# Patient Record
Sex: MALE | Race: WHITE | Employment: OTHER | URBAN - NONMETROPOLITAN AREA
[De-identification: names, ages, dates, MRNs, and addresses within clinical notes are randomized per-mention and may not be internally consistent; named-entity substitution may affect disease eponyms.]

---

## 2022-03-28 ENCOUNTER — HOSPITAL ENCOUNTER (OUTPATIENT)
Age: 57
Setting detail: OBSERVATION
Discharge: HOME | End: 2022-03-31
Attending: EMERGENCY MEDICINE | Admitting: INTERNAL MEDICINE
Payer: COMMERCIAL

## 2022-03-28 ENCOUNTER — APPOINTMENT (OUTPATIENT)
Dept: GENERAL RADIOLOGY | Age: 57
End: 2022-03-28
Payer: COMMERCIAL

## 2022-03-28 DIAGNOSIS — L03.116 CELLULITIS OF LEFT LOWER EXTREMITY: Primary | ICD-10-CM

## 2022-03-28 PROBLEM — L03.90 CELLULITIS: Status: ACTIVE | Noted: 2022-03-28

## 2022-03-28 LAB
ANION GAP SERPL CALCULATED.3IONS-SCNC: 14 MEQ/L (ref 8–16)
BACTERIA: NORMAL
BASOPHILS # BLD: 0.4 %
BASOPHILS ABSOLUTE: 0 THOU/MM3 (ref 0–0.1)
BILIRUBIN URINE: NEGATIVE
BLOOD, URINE: NEGATIVE
BUN BLDV-MCNC: 20 MG/DL (ref 7–22)
C-REACTIVE PROTEIN: 15.62 MG/DL (ref 0–1)
CALCIUM SERPL-MCNC: 9.4 MG/DL (ref 8.5–10.5)
CASTS: NORMAL /LPF
CASTS: NORMAL /LPF
CHARACTER, URINE: CLEAR
CHLORIDE BLD-SCNC: 97 MEQ/L (ref 98–111)
CO2: 26 MEQ/L (ref 23–33)
COLOR: YELLOW
CREAT SERPL-MCNC: 1.2 MG/DL (ref 0.4–1.2)
CRYSTALS: NORMAL
EOSINOPHIL # BLD: 3 %
EOSINOPHILS ABSOLUTE: 0.2 THOU/MM3 (ref 0–0.4)
EPITHELIAL CELLS, UA: NORMAL /HPF
ERYTHROCYTE [DISTWIDTH] IN BLOOD BY AUTOMATED COUNT: 12.7 % (ref 11.5–14.5)
ERYTHROCYTE [DISTWIDTH] IN BLOOD BY AUTOMATED COUNT: 44.8 FL (ref 35–45)
GFR SERPL CREATININE-BSD FRML MDRD: 62 ML/MIN/1.73M2
GLUCOSE BLD-MCNC: 120 MG/DL (ref 70–108)
GLUCOSE, URINE: NEGATIVE MG/DL
HCT VFR BLD CALC: 37.9 % (ref 42–52)
HEMOGLOBIN: 12.3 GM/DL (ref 14–18)
IMMATURE GRANS (ABS): 0.04 THOU/MM3 (ref 0–0.07)
IMMATURE GRANULOCYTES: 0.5 %
KETONES, URINE: NEGATIVE
LACTIC ACID: 1.7 MMOL/L (ref 0.5–2)
LEUKOCYTE ESTERASE, URINE: NEGATIVE
LYMPHOCYTES # BLD: 16.4 %
LYMPHOCYTES ABSOLUTE: 1.3 THOU/MM3 (ref 1–4.8)
MCH RBC QN AUTO: 31.1 PG (ref 26–33)
MCHC RBC AUTO-ENTMCNC: 32.5 GM/DL (ref 32.2–35.5)
MCV RBC AUTO: 95.9 FL (ref 80–94)
MISCELLANEOUS LAB TEST RESULT: NORMAL
MONOCYTES # BLD: 10.8 %
MONOCYTES ABSOLUTE: 0.8 THOU/MM3 (ref 0.4–1.3)
MRSA SCREEN RT-PCR: NEGATIVE
NITRITE, URINE: NEGATIVE
NUCLEATED RED BLOOD CELLS: 0 /100 WBC
PH UA: 5 (ref 5–9)
PLATELET # BLD: 190 THOU/MM3 (ref 130–400)
PMV BLD AUTO: 10.2 FL (ref 9.4–12.4)
POTASSIUM SERPL-SCNC: 4.3 MEQ/L (ref 3.5–5.2)
PROTEIN UA: NEGATIVE MG/DL
RBC # BLD: 3.95 MILL/MM3 (ref 4.7–6.1)
RBC URINE: NORMAL /HPF
RENAL EPITHELIAL, UA: NORMAL
SEDIMENTATION RATE, ERYTHROCYTE: 80 MM/HR (ref 0–10)
SEG NEUTROPHILS: 68.9 %
SEGMENTED NEUTROPHILS ABSOLUTE COUNT: 5.4 THOU/MM3 (ref 1.8–7.7)
SODIUM BLD-SCNC: 137 MEQ/L (ref 135–145)
SPECIFIC GRAVITY UA: 1.01 (ref 1–1.03)
URIC ACID: 7.7 MG/DL (ref 3.7–7)
UROBILINOGEN, URINE: 0.2 EU/DL (ref 0–1)
WBC # BLD: 7.8 THOU/MM3 (ref 4.8–10.8)
WBC UA: NORMAL /HPF
YEAST: NORMAL

## 2022-03-28 PROCEDURE — 83605 ASSAY OF LACTIC ACID: CPT

## 2022-03-28 PROCEDURE — 2580000003 HC RX 258

## 2022-03-28 PROCEDURE — 96361 HYDRATE IV INFUSION ADD-ON: CPT

## 2022-03-28 PROCEDURE — G0378 HOSPITAL OBSERVATION PER HR: HCPCS

## 2022-03-28 PROCEDURE — 84550 ASSAY OF BLOOD/URIC ACID: CPT

## 2022-03-28 PROCEDURE — 96365 THER/PROPH/DIAG IV INF INIT: CPT

## 2022-03-28 PROCEDURE — 80048 BASIC METABOLIC PNL TOTAL CA: CPT

## 2022-03-28 PROCEDURE — 6370000000 HC RX 637 (ALT 250 FOR IP): Performed by: PHYSICIAN ASSISTANT

## 2022-03-28 PROCEDURE — 86140 C-REACTIVE PROTEIN: CPT

## 2022-03-28 PROCEDURE — 73630 X-RAY EXAM OF FOOT: CPT

## 2022-03-28 PROCEDURE — 6360000002 HC RX W HCPCS: Performed by: PHARMACIST

## 2022-03-28 PROCEDURE — 81001 URINALYSIS AUTO W/SCOPE: CPT

## 2022-03-28 PROCEDURE — 6370000000 HC RX 637 (ALT 250 FOR IP)

## 2022-03-28 PROCEDURE — 87641 MR-STAPH DNA AMP PROBE: CPT

## 2022-03-28 PROCEDURE — 99220 PR INITIAL OBSERVATION CARE/DAY 70 MINUTES: CPT | Performed by: PHYSICIAN ASSISTANT

## 2022-03-28 PROCEDURE — 96372 THER/PROPH/DIAG INJ SC/IM: CPT

## 2022-03-28 PROCEDURE — 85651 RBC SED RATE NONAUTOMATED: CPT

## 2022-03-28 PROCEDURE — 6360000002 HC RX W HCPCS: Performed by: PHYSICIAN ASSISTANT

## 2022-03-28 PROCEDURE — 96366 THER/PROPH/DIAG IV INF ADDON: CPT

## 2022-03-28 PROCEDURE — 73610 X-RAY EXAM OF ANKLE: CPT

## 2022-03-28 PROCEDURE — 96367 TX/PROPH/DG ADDL SEQ IV INF: CPT

## 2022-03-28 PROCEDURE — 2580000003 HC RX 258: Performed by: PHARMACIST

## 2022-03-28 PROCEDURE — 85025 COMPLETE CBC W/AUTO DIFF WBC: CPT

## 2022-03-28 PROCEDURE — 99283 EMERGENCY DEPT VISIT LOW MDM: CPT

## 2022-03-28 PROCEDURE — 2580000003 HC RX 258: Performed by: PHYSICIAN ASSISTANT

## 2022-03-28 PROCEDURE — 36415 COLL VENOUS BLD VENIPUNCTURE: CPT

## 2022-03-28 PROCEDURE — 6360000002 HC RX W HCPCS

## 2022-03-28 RX ORDER — ONDANSETRON 2 MG/ML
4 INJECTION INTRAMUSCULAR; INTRAVENOUS EVERY 6 HOURS PRN
Status: DISCONTINUED | OUTPATIENT
Start: 2022-03-28 | End: 2022-03-31 | Stop reason: HOSPADM

## 2022-03-28 RX ORDER — CIPROFLOXACIN 250 MG/1
250 TABLET, FILM COATED ORAL 2 TIMES DAILY
Status: ON HOLD | COMMUNITY
End: 2022-03-31 | Stop reason: HOSPADM

## 2022-03-28 RX ORDER — HYDROCODONE BITARTRATE AND ACETAMINOPHEN 5; 325 MG/1; MG/1
1 TABLET ORAL ONCE
Status: COMPLETED | OUTPATIENT
Start: 2022-03-28 | End: 2022-03-28

## 2022-03-28 RX ORDER — HYDROCODONE BITARTRATE AND ACETAMINOPHEN 7.5; 325 MG/1; MG/1
1 TABLET ORAL EVERY 6 HOURS PRN
Status: DISCONTINUED | OUTPATIENT
Start: 2022-03-28 | End: 2022-03-31 | Stop reason: HOSPADM

## 2022-03-28 RX ORDER — AMLODIPINE BESYLATE 5 MG/1
5 TABLET ORAL DAILY
COMMUNITY

## 2022-03-28 RX ORDER — SODIUM CHLORIDE 0.9 % (FLUSH) 0.9 %
10 SYRINGE (ML) INJECTION EVERY 12 HOURS SCHEDULED
Status: DISCONTINUED | OUTPATIENT
Start: 2022-03-28 | End: 2022-03-31 | Stop reason: HOSPADM

## 2022-03-28 RX ORDER — POLYETHYLENE GLYCOL 3350 17 G/17G
17 POWDER, FOR SOLUTION ORAL DAILY PRN
Status: DISCONTINUED | OUTPATIENT
Start: 2022-03-28 | End: 2022-03-31 | Stop reason: HOSPADM

## 2022-03-28 RX ORDER — MAGNESIUM SULFATE IN WATER 40 MG/ML
2000 INJECTION, SOLUTION INTRAVENOUS PRN
Status: DISCONTINUED | OUTPATIENT
Start: 2022-03-28 | End: 2022-03-31 | Stop reason: HOSPADM

## 2022-03-28 RX ORDER — SODIUM CHLORIDE 0.9 % (FLUSH) 0.9 %
10 SYRINGE (ML) INJECTION PRN
Status: DISCONTINUED | OUTPATIENT
Start: 2022-03-28 | End: 2022-03-31 | Stop reason: HOSPADM

## 2022-03-28 RX ORDER — ATORVASTATIN CALCIUM 20 MG/1
20 TABLET, FILM COATED ORAL DAILY
COMMUNITY

## 2022-03-28 RX ORDER — CEPHALEXIN 500 MG/1
500 CAPSULE ORAL 4 TIMES DAILY
Status: ON HOLD | COMMUNITY
End: 2022-03-31 | Stop reason: HOSPADM

## 2022-03-28 RX ORDER — SODIUM CHLORIDE 9 MG/ML
INJECTION, SOLUTION INTRAVENOUS PRN
Status: DISCONTINUED | OUTPATIENT
Start: 2022-03-28 | End: 2022-03-31 | Stop reason: HOSPADM

## 2022-03-28 RX ORDER — METOPROLOL TARTRATE 100 MG/1
100 TABLET ORAL DAILY
COMMUNITY

## 2022-03-28 RX ORDER — OMEPRAZOLE 20 MG/1
20 CAPSULE, DELAYED RELEASE ORAL DAILY
COMMUNITY

## 2022-03-28 RX ORDER — POTASSIUM CHLORIDE 20 MEQ/1
40 TABLET, EXTENDED RELEASE ORAL PRN
Status: DISCONTINUED | OUTPATIENT
Start: 2022-03-28 | End: 2022-03-31 | Stop reason: HOSPADM

## 2022-03-28 RX ORDER — LISINOPRIL AND HYDROCHLOROTHIAZIDE 25; 20 MG/1; MG/1
1 TABLET ORAL DAILY
COMMUNITY

## 2022-03-28 RX ORDER — ACETAMINOPHEN 325 MG/1
650 TABLET ORAL EVERY 6 HOURS PRN
Status: DISCONTINUED | OUTPATIENT
Start: 2022-03-28 | End: 2022-03-31 | Stop reason: HOSPADM

## 2022-03-28 RX ORDER — ACETAMINOPHEN 650 MG/1
650 SUPPOSITORY RECTAL EVERY 6 HOURS PRN
Status: DISCONTINUED | OUTPATIENT
Start: 2022-03-28 | End: 2022-03-31 | Stop reason: HOSPADM

## 2022-03-28 RX ORDER — ONDANSETRON 4 MG/1
4 TABLET, ORALLY DISINTEGRATING ORAL EVERY 8 HOURS PRN
Status: DISCONTINUED | OUTPATIENT
Start: 2022-03-28 | End: 2022-03-31 | Stop reason: HOSPADM

## 2022-03-28 RX ORDER — POTASSIUM CHLORIDE 7.45 MG/ML
10 INJECTION INTRAVENOUS PRN
Status: DISCONTINUED | OUTPATIENT
Start: 2022-03-28 | End: 2022-03-31 | Stop reason: HOSPADM

## 2022-03-28 RX ORDER — HYDROCODONE BITARTRATE AND ACETAMINOPHEN 5; 325 MG/1; MG/1
1 TABLET ORAL EVERY 6 HOURS PRN
COMMUNITY

## 2022-03-28 RX ORDER — SODIUM CHLORIDE 9 MG/ML
INJECTION, SOLUTION INTRAVENOUS CONTINUOUS
Status: DISCONTINUED | OUTPATIENT
Start: 2022-03-28 | End: 2022-03-31 | Stop reason: HOSPADM

## 2022-03-28 RX ADMIN — HYDROCODONE BITARTRATE AND ACETAMINOPHEN 1 TABLET: 5; 325 TABLET ORAL at 15:57

## 2022-03-28 RX ADMIN — HYDROCODONE BITARTRATE AND ACETAMINOPHEN 1 TABLET: 7.5; 325 TABLET ORAL at 21:06

## 2022-03-28 RX ADMIN — PIPERACILLIN AND TAZOBACTAM 3375 MG: 3; .375 INJECTION, POWDER, LYOPHILIZED, FOR SOLUTION INTRAVENOUS at 17:17

## 2022-03-28 RX ADMIN — SODIUM CHLORIDE: 9 INJECTION, SOLUTION INTRAVENOUS at 20:22

## 2022-03-28 RX ADMIN — SODIUM CHLORIDE: 9 INJECTION, SOLUTION INTRAVENOUS at 17:11

## 2022-03-28 RX ADMIN — VANCOMYCIN HYDROCHLORIDE 1750 MG: 5 INJECTION, POWDER, LYOPHILIZED, FOR SOLUTION INTRAVENOUS at 21:08

## 2022-03-28 RX ADMIN — ENOXAPARIN SODIUM 40 MG: 40 INJECTION SUBCUTANEOUS at 21:06

## 2022-03-28 ASSESSMENT — ENCOUNTER SYMPTOMS
NAUSEA: 0
SHORTNESS OF BREATH: 0
VOMITING: 0

## 2022-03-28 ASSESSMENT — PAIN DESCRIPTION - PROGRESSION: CLINICAL_PROGRESSION: GRADUALLY WORSENING

## 2022-03-28 ASSESSMENT — PAIN DESCRIPTION - ONSET: ONSET: ON-GOING

## 2022-03-28 ASSESSMENT — PAIN DESCRIPTION - FREQUENCY: FREQUENCY: CONTINUOUS

## 2022-03-28 ASSESSMENT — PAIN DESCRIPTION - ORIENTATION
ORIENTATION: LEFT
ORIENTATION: LEFT

## 2022-03-28 ASSESSMENT — PAIN SCALES - GENERAL
PAINLEVEL_OUTOF10: 5
PAINLEVEL_OUTOF10: 8
PAINLEVEL_OUTOF10: 8
PAINLEVEL_OUTOF10: 7
PAINLEVEL_OUTOF10: 7

## 2022-03-28 ASSESSMENT — PAIN - FUNCTIONAL ASSESSMENT: PAIN_FUNCTIONAL_ASSESSMENT: 0-10

## 2022-03-28 ASSESSMENT — PAIN DESCRIPTION - PAIN TYPE
TYPE: ACUTE PAIN
TYPE: ACUTE PAIN

## 2022-03-28 ASSESSMENT — PAIN DESCRIPTION - LOCATION
LOCATION: FOOT
LOCATION: FOOT

## 2022-03-28 NOTE — PROGRESS NOTES
Pharmacy Consult      Prema Gandhi is a 59 y.o. male for whom pharmacy has been consulted to dose Zosyn. Patient Active Problem List   Diagnosis    Cellulitis     Allergies:  Naproxen and Sulfa antibiotics     Recent Labs     03/28/22  1323   CREATININE 1.2     Ht/Wt:   Ht Readings from Last 1 Encounters:   03/28/22 5' 8\" (1.727 m)        Wt Readings from Last 1 Encounters:   03/28/22 211 lb (95.7 kg)       Estimated Creatinine Clearance: 74 mL/min (based on SCr of 1.2 mg/dL). Assessment/Plan:  Will start Zosyn 3.375g IV q8h extended infusion for CrCl above 20 ml/min    Thank you for the consult.       Dillan Salguero, PharmD, BCPS 3/28/2022 4:03 PM

## 2022-03-28 NOTE — ED TRIAGE NOTES
Pt to the ED with c/o left foot swelling and redness. Pt states he was seen at [facility] and was given an antibiotic and pain medication 3 day ago.  Pt states it is not better so he came for further eval.

## 2022-03-28 NOTE — H&P
Hospitalist History & Physical    Patient:  Gena Brenner    Unit/Bed:6E-57/057-A  YOB: 1965  MRN: [id number]   Acct: [de-identified]   PCP: Temo Ng MD  Code Status: Full Code    Date of Service: Pt seen/examined on 03/28/22 and admitted to Inpatient with expected LOS greater than two midnights due to medical therapy. Chief Complaint: left foot pain    Assessment/Plan:    1. LLE cellulitis: Pt was started on Kelfex and Cipro as outpatient 3 days ago. Erythema worsening. See media. ESR, CPR significantly elevated. Outline borders, apply ACE wrap. He was started on Zosyn and Vanc in the ED, cont for now. If MRSA PCR is negative, consider deescalating abx to IV cefazolin. Concern for cellulitis vs gout flare. Uric acid is 7.7. Left foot XR reported calcaneal enthesophytes. However, difficult to assess if there is joint involvement with his significant swelling and erythema and pt denies hx gout. Clinically more consistent with cellulitis, will continue abx and monitor for improvement. Consider podiatry consult. 2. Elevated uric acid: As stated above. Will hydrate patient. Check UA. Repeat in AM.       3. LELA vs CKD stage 2: Pt appears dry, reports poor po intake. Will give IVF and repeat in AM.     4. Normocytic anemia: mild, no signs of acute bleeding. 5. Hyperglycemia: Mild. Check A1c. History of Present Illness: This is a 59year old male with PMHx HTN, HLD who presented to [address] [place] [address] for evaluation of left foot pain and swelling. The patient reports redness and swelling starting 5 days ago. He states he was seen at OSH on 3/25 was diagnosed with cellulitis, sent home on oral Keflex and Cipro. Erythema was outlined at that time and patient was told to go to the ER if it spread beyond its borders. Patient states he noticed this morning erythema had worsened. He states his swelling and pain continue to worsen.  He denies injury, however there is a small scab on the bottom of his foot where it looks like he may have had a small puncture wound. Patient reports subjective fever/chills. The patient states that he has not been eating and drinking well due to generally not feeling well. Patient denies shortness of breath, chest pain, abdominal pain, N/V/D, urinary symptoms. Patient mated to hospitalist service for further management      Review of Systems: Pertinent positives as noted in the HPI. All other systems reviewed and negative. Past Medical History:        Diagnosis Date    Arthritis     History of blood transfusion     Hyperlipidemia     Hypertension        Past Surgical History:        Procedure Laterality Date    FACIAL FRACTURE SURGERY  1983       Home Medications:   No current facility-administered medications on file prior to encounter. Current Outpatient Medications on File Prior to Encounter   Medication Sig Dispense Refill    cephALEXin (KEFLEX) 500 MG capsule Take 500 mg by mouth 4 times daily      atorvastatin (LIPITOR) 20 MG tablet Take 20 mg by mouth daily      amLODIPine (NORVASC) 5 MG tablet Take 5 mg by mouth daily      metoprolol (LOPRESSOR) 100 MG tablet Take 100 mg by mouth daily      omeprazole (PRILOSEC) 20 MG delayed release capsule Take 20 mg by mouth daily      lisinopril-hydroCHLOROthiazide (PRINZIDE;ZESTORETIC) 20-25 MG per tablet Take 1 tablet by mouth daily      ciprofloxacin (CIPRO) 250 MG tablet Take 250 mg by mouth 2 times daily      Multiple Vitamin (MULTI-DAY PO) Take 1 tablet by mouth      HYDROcodone-acetaminophen (NORCO) 5-325 MG per tablet Take 1 tablet by mouth every 6 hours as needed for Pain. Allergies:    Naproxen and Sulfa antibiotics    Social History:    reports that he has been smoking. He has been smoking about 1.00 pack per day. He uses smokeless tobacco. He reports current alcohol use of about 42.0 standard drinks of alcohol per week. Family History:   History reviewed.  No pertinent family history. Diet:  ADULT DIET; Regular      Physical Exam:  /74   Pulse 74   Temp 97.8 °F (36.6 °C) (Oral)   Resp 18   Ht 5' 8\" (1.727 m)   Wt 211 lb (95.7 kg)   SpO2 99%   BMI 32.08 kg/m²   General:   Pleasant male. NAD. HEENT:  normocephalic and atraumatic. No scleral icterus. PERR. Neck: supple. No JVD. No thyromegaly. Lungs: clear to auscultation. No retractions  Cardiac: RRR without murmur. Abdomen: soft. Nontender. Bowel sounds positive. Extremities:  No clubbing, cyanosis, or edema x 4. Vasculature: capillary refill < 3 seconds. Palpable LE pulses bilaterally. Skin:  warm and dry. No rashes or lesions. Psych:  Alert and oriented x3. Affect appropriate  Lymph:  No supraclavicular adenopathy. Neurologic:  No focal deficit. No seizures. Data: (All radiographs, tracings, PFTs, and imaging are personally viewed and interpreted unless otherwise noted)  Labs:   Recent Labs     03/28/22  1323   WBC 7.8   HGB 12.3*   HCT 37.9*        Recent Labs     03/28/22  1323      K 4.3   CL 97*   CO2 26   BUN 20   CREATININE 1.2   CALCIUM 9.4     No results for input(s): AST, ALT, BILIDIR, BILITOT, ALKPHOS in the last 72 hours. No results for input(s): INR in the last 72 hours. No results for input(s): Connee Matar in the last 72 hours. Urinalysis:   Lab Results   Component Value Date    NITRU NEGATIVE 03/28/2022    WBCUA 0-2 03/28/2022    BACTERIA NONE SEEN 03/28/2022    RBCUA NONE SEEN 03/28/2022    BLOODU NEGATIVE 03/28/2022    SPECGRAV 1.012 03/28/2022       EKG: No prior ECG    Radiology:  XR FOOT LEFT (MIN 3 VIEWS)   Final Result       1. No evidence of acute osseous abnormality of the left foot and ankle. 2. Calcaneal enthesophytes. **This report has been created using voice recognition software. It may contain minor errors which are inherent in voice recognition technology. **      Final report electronically signed by Dr. Shania Ayala MD on 3/28/2022 1:36 PM      XR ANKLE LEFT (MIN 3 VIEWS)   Final Result       1. No evidence of acute osseous abnormality of the left foot and ankle. 2. Calcaneal enthesophytes. **This report has been created using voice recognition software. It may contain minor errors which are inherent in voice recognition technology. **      Final report electronically signed by Dr. Jeremiah Troy MD on 3/28/2022 1:36 PM        XR ANKLE LEFT (MIN 3 VIEWS)    Result Date: 3/28/2022  PROCEDURE: XR ANKLE LEFT (MIN 3 VIEWS), XR FOOT LEFT (MIN 3 VIEWS) CLINICAL INFORMATION: pain. Pain and swelling in left foot and ankle for 6 days. No known injury. COMPARISON: No prior study. TECHNIQUE: 4 views of the left foot and 3 views of the left ankle. FINDINGS:  There is a suggestion of metatarsus primus varus and hallux valgus on these nonweightbearing views. There is otherwise anatomic vertebral body height and alignment. No fracture is evident. Joint spaces appear preserved. The ankle mortise appears intact. There is soft tissue swelling about the foot and ankle. There are calcaneal enthesophytes at the origin of the plantar fascia and insertion of Achilles tendon. 1. No evidence of acute osseous abnormality of the left foot and ankle. 2. Calcaneal enthesophytes. **This report has been created using voice recognition software. It may contain minor errors which are inherent in voice recognition technology. ** Final report electronically signed by Dr. Jeremiah Troy MD on 3/28/2022 1:36 PM    XR FOOT LEFT (MIN 3 VIEWS)    Result Date: 3/28/2022  PROCEDURE: XR ANKLE LEFT (MIN 3 VIEWS), XR FOOT LEFT (MIN 3 VIEWS) CLINICAL INFORMATION: pain. Pain and swelling in left foot and ankle for 6 days. No known injury. COMPARISON: No prior study. TECHNIQUE: 4 views of the left foot and 3 views of the left ankle. FINDINGS:  There is a suggestion of metatarsus primus varus and hallux valgus on these nonweightbearing views.  There is otherwise anatomic vertebral body height and alignment. No fracture is evident. Joint spaces appear preserved. The ankle mortise appears intact. There is soft tissue swelling about the foot and ankle. There are calcaneal enthesophytes at the origin of the plantar fascia and insertion of Achilles tendon. 1. No evidence of acute osseous abnormality of the left foot and ankle. 2. Calcaneal enthesophytes. **This report has been created using voice recognition software. It may contain minor errors which are inherent in voice recognition technology. ** Final report electronically signed by Dr. Meaghan Chao MD on 3/28/2022 1:36 PM        Tele:   [] yes             [x] no      Thank you rAiel Montalvo MD for the opportunity to be involved in this patient's care.     Electronically signed by Yvonne Coronel PA-C on 3/28/2022 at 7:18 PM

## 2022-03-28 NOTE — PROGRESS NOTES
Patient arrived to [phone] [id number] from ED with c/o left foot pain. Left foot is swollen red and tender. Strong pulse. Denies numbness. Oriented to room. Call light within reach. Sister at bedside.

## 2022-03-28 NOTE — FLOWSHEET NOTE
22 1830   Treatment Team Notification   Reason for Communication Review case   Team Member Name Dr. Coleta Apgar Provider   Method of Communication Secure Message   Response Waiting for response   Notification Time 1830   LLE cellulitis, worsening despite OP oral therapy

## 2022-03-28 NOTE — PROGRESS NOTES
[address] [facility] Pharmacokinetic Monitoring Service - Vancomycin     Kelsie Gonzalez is a 59 y.o. male starting on vancomycin therapy for LLE cellulitis. Pharmacy consulted by Mikey Elise DPM for monitoring and adjustment. Target Concentration: Goal trough of 10-15 mg/L and AUC/RIZWAN <500 mg*hr/L    Additional Antimicrobials: Zosyn    Pertinent Laboratory Values: Wt Readings from Last 1 Encounters:   03/28/22 211 lb (95.7 kg)     Temp Readings from Last 1 Encounters:   03/28/22 97.8 °F (36.6 °C) (Oral)     Estimated Creatinine Clearance: 74 mL/min (based on SCr of 1.2 mg/dL). Recent Labs     03/28/22  1323   CREATININE 1.2   WBC 7.8     Procalcitonin: n/a    Pertinent Cultures: None    MRSA Nasal Swab: N/A. Non-respiratory infection. Plan:  1. Dosing recommendations based on Bayesian software  2. Start vancomycin 1750mg IV q24h  a. Anticipated AUC of 461 and trough concentration of 12.8 at steady state  3. Renal labs as indicated   4. Vancomycin concentration not scheduled yet  5.  Pharmacy will continue to monitor patient and adjust therapy as indicated    Thank you for the consult,  Marleny [facility]  3/28/2022 4:05 PM

## 2022-03-28 NOTE — ED PROVIDER NOTES
[place] ENCOUNTER          Pt Name: Mehreen Mccormack  MRN: [id number]  [place] 1965  Date of evaluation: 3/28/2022  Treating Resident Physician: Jorge Patrick DPM  Supervising Physician: Bertram Nieves MD    CHIEF COMPLAINT       Chief Complaint   Patient presents with    Foot Swelling     History obtained from the patient. HISTORY OF PRESENT ILLNESS    HPI  Merheen Mccormack is a 59 y.o. non-diabetic male who presents to the emergency department for evaluation of left foot pain and swelling. The patient relates that his pain is 10 out of 10. The patient relates that the pain, swelling and redness started last Tuesday. He presented to the ED at [facility] on Friday. He relates that they took x-rays and there were no fractures. He states that they also tested him for gout which was negative. There is a marker line drawn around the proximal aspect/just distal to ankle, which the redness is exceeding. The patient is unaware of when the redness started to exceed to the line, but believes it just started last night. The patient denies any injury or trauma to left foot. His sister relates that there was a small painful area to the bottom of his left foot that she tried to scrape off. He relates that he had intense pain when she did so. The patient relates that his foot was initially very hot and is now warm. He was not given Keflex and ciprofloxacin, which he has taken for 3 days. The patient is unable to put foot in a shoe. A bed sheet causes pain to left foot. He relates that he has subjectively had a fever and chills. He denies any nausea or vomiting. The patient has no other acute complaints at this time. REVIEW OF SYSTEMS   Review of Systems   Constitutional: Positive for chills and fever. Respiratory: Negative for shortness of breath. Cardiovascular: Negative for chest pain.    Gastrointestinal: Negative for nausea and vomiting. PAST MEDICAL AND SURGICAL HISTORY   History reviewed. No pertinent past medical history. History reviewed. No pertinent surgical history. MEDICATIONS     Current Facility-Administered Medications:     HYDROcodone-acetaminophen (NORCO) 5-325 MG per tablet 1 tablet, 1 tablet, Oral, Once, Myrl José, DPM  No current outpatient medications on file. SOCIAL HISTORY     Social History     Social History Narrative    Not on file     Social History     Tobacco Use    Smoking status: Not on file    Smokeless tobacco: Not on file   Substance Use Topics    Alcohol use: Not on file    Drug use: Not on file         ALLERGIES   No Known Allergies      FAMILY HISTORY   History reviewed. No pertinent family history. PREVIOUS RECORDS   Previous records reviewed: Has previous ED encounters. .        PHYSICAL EXAM     ED Triage Vitals [03/28/22 1145]   BP Temp Temp Source Pulse Resp SpO2 Height Weight   122/84 98.4 °F (36.9 °C) Oral 83 (!) 100 100 % -- --     Initial vital signs and nursing assessment reviewed and normal. There is no height or weight on file to calculate BMI. Pulsoximetry is normal per my interpretation. Additional Vital Signs:  Vitals:    03/28/22 1341   BP: 120/74   Pulse: 79   Resp: 18   Temp:    SpO2: 98%       Physical Exam  Cardiovascular:      Pulses:           Dorsalis pedis pulses are detected w/ Doppler on the left side. Posterior tibial pulses are detected w/ Doppler on the left side. Musculoskeletal:      Left ankle: Swelling present. Tenderness present. Decreased range of motion. Normal pulse. Left foot: Decreased range of motion. Normal capillary refill. Swelling and tenderness present. Normal pulse. Feet:    Feet:      Left foot:      Protective Sensation: 10 sites tested. 10 sites sensed. Skin integrity: Erythema and warmth present. Neurological:      Mental Status: He is alert.              MEDICAL DECISION MAKING Initial Assessment:   1. Cellulitis, left foot. Plan:    Patient seen and evaluated.  Labs: CBC, BMP, CRP, ESR, uric acid, lactic acid, GFR; taken and reviewed, significant CRP value of 15.62   Respiratory and pulse vitals reevaluated; stable   X-rays of left foot and left ankle taken and reviewed; no evidence of acute osseous abnormality   Dispensed 1 tablet of [place] in ED   Pharmacy to dose: Vancomycin and Zosyn   All of patient's questions were addressed   Patient is being admitted. ED RESULTS   Laboratory results:  Labs Reviewed   CBC WITH AUTO DIFFERENTIAL - Abnormal; Notable for the following components:       Result Value    RBC 3.95 (*)     Hemoglobin 12.3 (*)     Hematocrit 37.9 (*)     MCV 95.9 (*)     All other components within normal limits   BASIC METABOLIC PANEL - Abnormal; Notable for the following components:    Chloride 97 (*)     Glucose 120 (*)     All other components within normal limits   C-REACTIVE PROTEIN - Abnormal; Notable for the following components:    CRP 15.62 (*)     All other components within normal limits   URIC ACID - Abnormal; Notable for the following components:    Uric Acid 7.7 (*)     All other components within normal limits   GLOMERULAR FILTRATION RATE, ESTIMATED - Abnormal; Notable for the following components:    Est, Glom Filt Rate 62 (*)     All other components within normal limits   LACTIC ACID   ANION GAP   SEDIMENTATION RATE       Radiologic studies results:  XR FOOT LEFT (MIN 3 VIEWS)   Final Result       1. No evidence of acute osseous abnormality of the left foot and ankle. 2. Calcaneal enthesophytes. **This report has been created using voice recognition software. It may contain minor errors which are inherent in voice recognition technology. **      Final report electronically signed by Dr. Nancy Elkins MD on 3/28/2022 1:36 PM      XR ANKLE LEFT (MIN 3 VIEWS)   Final Result       1.  No evidence of acute osseous abnormality of the left foot and ankle. 2. Calcaneal enthesophytes. **This report has been created using voice recognition software. It may contain minor errors which are inherent in voice recognition technology. **      Final report electronically signed by Dr. Dora Andrea MD on 3/28/2022 1:36 PM          ED Medications administered this visit:   Medications   HYDROcodone-acetaminophen (NORCO) 5-325 MG per tablet 1 tablet (has no administration in time range)         ED COURSE          Strict return precautions and follow up instructions were discussed with the patient prior to discharge, with which the patient agrees. MEDICATION CHANGES     New Prescriptions    No medications on file         FINAL DISPOSITION     Final diagnoses:   Cellulitis of left lower extremity     Condition: condition: fair  Dispo: Admit to med/surg floor      This transcription was electronically signed. Parts of this transcriptions may have been dictated by use of voice recognition software and electronically transcribed, and parts may have been transcribed with the assistance of an ED scribe. The transcription may contain errors not detected in proofreading. Please refer to my supervising physician's documentation if my documentation differs. Electronically Signed: Scott Smith DPM, 03/28/22, 2:37 PM         Scott Smith DPM  Resident  03/28/22 9714    Attending Supervising [de-identified] Attestation Statement  I performed a history and physical examination on the patient and discussed the management with the resident physician. I reviewed and agree with the findings and plan as documented in her note unless described otherwise below. Pt presents to the Er with worsening redness, swelling and pain to L foot, primarily on dorsum of foot. Redness getting worse (advanced past previous demarcated line) despite being on oral abx x 3 days.   Given significant cellulitis, worsening symptoms despite oral abx, will admit patient for iv abx for cellulitis with failed outpatient treatment.     Electronically signed by Lisa Ron MD on 3/28/22 at 7:28 PM EDT       Bertram Nieves MD  03/28/22 9225

## 2022-03-29 ENCOUNTER — APPOINTMENT (OUTPATIENT)
Dept: ULTRASOUND IMAGING | Age: 57
End: 2022-03-29
Payer: COMMERCIAL

## 2022-03-29 LAB
ANION GAP SERPL CALCULATED.3IONS-SCNC: 13 MEQ/L (ref 8–16)
AVERAGE GLUCOSE: 96 MG/DL (ref 70–126)
BASOPHILS # BLD: 0.4 %
BASOPHILS ABSOLUTE: 0 THOU/MM3 (ref 0–0.1)
BUN BLDV-MCNC: 18 MG/DL (ref 7–22)
CALCIUM SERPL-MCNC: 8.8 MG/DL (ref 8.5–10.5)
CHLORIDE BLD-SCNC: 99 MEQ/L (ref 98–111)
CO2: 26 MEQ/L (ref 23–33)
CREAT SERPL-MCNC: 1.3 MG/DL (ref 0.4–1.2)
EOSINOPHIL # BLD: 4.5 %
EOSINOPHILS ABSOLUTE: 0.3 THOU/MM3 (ref 0–0.4)
ERYTHROCYTE [DISTWIDTH] IN BLOOD BY AUTOMATED COUNT: 12.6 % (ref 11.5–14.5)
ERYTHROCYTE [DISTWIDTH] IN BLOOD BY AUTOMATED COUNT: 43.5 FL (ref 35–45)
GFR SERPL CREATININE-BSD FRML MDRD: 56 ML/MIN/1.73M2
GLUCOSE BLD-MCNC: 112 MG/DL (ref 70–108)
HBA1C MFR BLD: 5.2 % (ref 4.4–6.4)
HCT VFR BLD CALC: 34.3 % (ref 42–52)
HEMOGLOBIN: 11.4 GM/DL (ref 14–18)
IMMATURE GRANS (ABS): 0.02 THOU/MM3 (ref 0–0.07)
IMMATURE GRANULOCYTES: 0.4 %
LYMPHOCYTES # BLD: 19.6 %
LYMPHOCYTES ABSOLUTE: 1.1 THOU/MM3 (ref 1–4.8)
MCH RBC QN AUTO: 31.4 PG (ref 26–33)
MCHC RBC AUTO-ENTMCNC: 33.2 GM/DL (ref 32.2–35.5)
MCV RBC AUTO: 94.5 FL (ref 80–94)
MONOCYTES # BLD: 10.6 %
MONOCYTES ABSOLUTE: 0.6 THOU/MM3 (ref 0.4–1.3)
NUCLEATED RED BLOOD CELLS: 0 /100 WBC
PLATELET # BLD: 188 THOU/MM3 (ref 130–400)
PMV BLD AUTO: 9.8 FL (ref 9.4–12.4)
POTASSIUM REFLEX MAGNESIUM: 4.2 MEQ/L (ref 3.5–5.2)
RBC # BLD: 3.63 MILL/MM3 (ref 4.7–6.1)
SEG NEUTROPHILS: 64.5 %
SEGMENTED NEUTROPHILS ABSOLUTE COUNT: 3.6 THOU/MM3 (ref 1.8–7.7)
SODIUM BLD-SCNC: 138 MEQ/L (ref 135–145)
URIC ACID: 6.8 MG/DL (ref 3.7–7)
WBC # BLD: 5.6 THOU/MM3 (ref 4.8–10.8)

## 2022-03-29 PROCEDURE — 84550 ASSAY OF BLOOD/URIC ACID: CPT

## 2022-03-29 PROCEDURE — 96372 THER/PROPH/DIAG INJ SC/IM: CPT

## 2022-03-29 PROCEDURE — 99225 PR SBSQ OBSERVATION CARE/DAY 25 MINUTES: CPT | Performed by: INTERNAL MEDICINE

## 2022-03-29 PROCEDURE — 36415 COLL VENOUS BLD VENIPUNCTURE: CPT

## 2022-03-29 PROCEDURE — G0378 HOSPITAL OBSERVATION PER HR: HCPCS

## 2022-03-29 PROCEDURE — 96366 THER/PROPH/DIAG IV INF ADDON: CPT

## 2022-03-29 PROCEDURE — 6360000002 HC RX W HCPCS

## 2022-03-29 PROCEDURE — 96361 HYDRATE IV INFUSION ADD-ON: CPT

## 2022-03-29 PROCEDURE — 6370000000 HC RX 637 (ALT 250 FOR IP): Performed by: PHYSICIAN ASSISTANT

## 2022-03-29 PROCEDURE — 2580000003 HC RX 258: Performed by: PHARMACIST

## 2022-03-29 PROCEDURE — 85025 COMPLETE CBC W/AUTO DIFF WBC: CPT

## 2022-03-29 PROCEDURE — 83036 HEMOGLOBIN GLYCOSYLATED A1C: CPT

## 2022-03-29 PROCEDURE — 2580000003 HC RX 258: Performed by: PHYSICIAN ASSISTANT

## 2022-03-29 PROCEDURE — 2580000003 HC RX 258

## 2022-03-29 PROCEDURE — 80048 BASIC METABOLIC PNL TOTAL CA: CPT

## 2022-03-29 PROCEDURE — 76770 US EXAM ABDO BACK WALL COMP: CPT

## 2022-03-29 PROCEDURE — 6360000002 HC RX W HCPCS: Performed by: PHYSICIAN ASSISTANT

## 2022-03-29 PROCEDURE — 6360000002 HC RX W HCPCS: Performed by: PHARMACIST

## 2022-03-29 RX ORDER — CYCLOBENZAPRINE HCL 10 MG
5 TABLET ORAL ONCE
Status: COMPLETED | OUTPATIENT
Start: 2022-03-29 | End: 2022-03-30

## 2022-03-29 RX ADMIN — PIPERACILLIN AND TAZOBACTAM 3375 MG: 3; .375 INJECTION, POWDER, LYOPHILIZED, FOR SOLUTION INTRAVENOUS at 17:03

## 2022-03-29 RX ADMIN — PIPERACILLIN AND TAZOBACTAM 3375 MG: 3; .375 INJECTION, POWDER, LYOPHILIZED, FOR SOLUTION INTRAVENOUS at 00:39

## 2022-03-29 RX ADMIN — PIPERACILLIN AND TAZOBACTAM 3375 MG: 3; .375 INJECTION, POWDER, LYOPHILIZED, FOR SOLUTION INTRAVENOUS at 08:29

## 2022-03-29 RX ADMIN — VANCOMYCIN HYDROCHLORIDE 1750 MG: 5 INJECTION, POWDER, LYOPHILIZED, FOR SOLUTION INTRAVENOUS at 21:27

## 2022-03-29 RX ADMIN — ENOXAPARIN SODIUM 40 MG: 40 INJECTION SUBCUTANEOUS at 21:26

## 2022-03-29 RX ADMIN — HYDROCODONE BITARTRATE AND ACETAMINOPHEN 1 TABLET: 7.5; 325 TABLET ORAL at 03:21

## 2022-03-29 RX ADMIN — HYDROCODONE BITARTRATE AND ACETAMINOPHEN 1 TABLET: 7.5; 325 TABLET ORAL at 17:50

## 2022-03-29 RX ADMIN — SODIUM CHLORIDE, PRESERVATIVE FREE 10 ML: 5 INJECTION INTRAVENOUS at 08:27

## 2022-03-29 RX ADMIN — HYDROCODONE BITARTRATE AND ACETAMINOPHEN 1 TABLET: 7.5; 325 TABLET ORAL at 10:22

## 2022-03-29 ASSESSMENT — PAIN DESCRIPTION - PROGRESSION
CLINICAL_PROGRESSION: GRADUALLY IMPROVING
CLINICAL_PROGRESSION: NOT CHANGED
CLINICAL_PROGRESSION: GRADUALLY IMPROVING
CLINICAL_PROGRESSION: NOT CHANGED
CLINICAL_PROGRESSION: GRADUALLY IMPROVING
CLINICAL_PROGRESSION: NOT CHANGED
CLINICAL_PROGRESSION: GRADUALLY IMPROVING
CLINICAL_PROGRESSION: GRADUALLY IMPROVING

## 2022-03-29 ASSESSMENT — PAIN - FUNCTIONAL ASSESSMENT
PAIN_FUNCTIONAL_ASSESSMENT: PREVENTS OR INTERFERES SOME ACTIVE ACTIVITIES AND ADLS
PAIN_FUNCTIONAL_ASSESSMENT: ACTIVITIES ARE NOT PREVENTED

## 2022-03-29 ASSESSMENT — PAIN DESCRIPTION - ORIENTATION
ORIENTATION: LEFT

## 2022-03-29 ASSESSMENT — PAIN DESCRIPTION - LOCATION
LOCATION: HIP;FOOT
LOCATION: HIP;FOOT
LOCATION: FOOT

## 2022-03-29 ASSESSMENT — PAIN SCALES - GENERAL
PAINLEVEL_OUTOF10: 7
PAINLEVEL_OUTOF10: 6
PAINLEVEL_OUTOF10: 5
PAINLEVEL_OUTOF10: 7
PAINLEVEL_OUTOF10: 5
PAINLEVEL_OUTOF10: 6
PAINLEVEL_OUTOF10: 5

## 2022-03-29 ASSESSMENT — PAIN DESCRIPTION - PAIN TYPE
TYPE: ACUTE PAIN

## 2022-03-29 ASSESSMENT — PAIN DESCRIPTION - FREQUENCY
FREQUENCY: CONTINUOUS

## 2022-03-29 ASSESSMENT — PAIN DESCRIPTION - ONSET
ONSET: ON-GOING

## 2022-03-29 ASSESSMENT — PAIN DESCRIPTION - DESCRIPTORS
DESCRIPTORS: ACHING

## 2022-03-29 NOTE — PLAN OF CARE
Problem: Falls - Risk of:  Goal: Will remain free from falls  Description: Will remain free from falls  3/29/2022 0940 by Miguel Hidalgo RN  Note: Pt remains free from falls this shift, call light within reach, bed in lowest position, hourly rounding continued. Problem: Mobility - Impaired:  Goal: Mobility will improve to maximum level  Description: Mobility will improve to maximum level  3/29/2022 0940 by Miguel Hidalgo RN  Note: Pt unable to ambulate at this time, pt/ot ordered. Problem: Pain:  Goal: Control of acute pain  Description: Control of acute pain  3/29/2022 0940 by Miguel Hidalgo RN  Note: Pt complains of 4-6/10 pain this shift prn norco every 6 hours. Problem: Skin Integrity - Impaired:  Goal: Will show no infection signs and symptoms  Description: Will show no infection signs and symptoms  3/29/2022 0940 by Miguel Hidalgo RN  Note: Pt remains free from new skin breakdown this shift, cellulitis marked and no spreading noticed, pt afebrile this shift. Problem: Discharge Planning:  Goal: Discharged to appropriate level of care  Description: Discharged to appropriate level of care  3/29/2022 0940 by Miguel Hidalgo RN  Note: Pt plans to discharge home alone when medically appropriate, no needs voiced at this time.

## 2022-03-29 NOTE — PROGRESS NOTES
Hospitalist Progress Note      Patient:  Jakub Lora    Unit/Bed:6E-57/057-A  YOB: 1965  MRN: [id number]   Acct: [de-identified]     PCP: Anu Bloom MD  Date of Admission: 3/28/2022        Chief Complaint: left foot pain    Assessment/Plan:    1. Left foot cellulitis no sepsis : Pt was started on Kelfex and Cipro as outpatient 3 days ago. Erythema worsening. See media. ESR, CPR significantly elevated. Outline borders, apply ACE wrap. He was started on Zosyn and Vanc in the ED, cont for now. If MRSA PCR is negative, consider deescalating abx to IV cefazolin. Concern for cellulitis vs gout flare. Uric acid is 7.7. Left foot XR reported calcaneal enthesophytes. However, difficult to assess if there is joint involvement with his significant swelling and erythema and pt denies hx gout. Clinically more consistent with cellulitis, will continue abx and monitor for improvement. Consider podiatry consult. 3/29-foot looks much improved from yesterday-continue IV antibiotics for now will defer podiatry consult for now follow-up culture reports and will de-escalate antibiotics accordingly    2. Elevated uric acid: As stated above. Will hydrate patient. Check UA. Repeat in AM.       3. LELA vs CKD stage 2: Pt appears dry, reports poor po intake. Will give IVF and repeat in AM.     4. Normocytic anemia: mild, no signs of acute bleeding. 5. Hyperglycemia: Mild. Check A1c. History of Present Illness: This is a 59year old male with PMHx HTN, HLD who presented to [facility] for evaluation of left foot pain and swelling. The patient reports redness and swelling starting 5 days ago. He states he was seen at OSH on 3/25 was diagnosed with cellulitis, sent home on oral Keflex and Cipro. Erythema was outlined at that time and patient was told to go to the ER if it spread beyond its borders. Patient states he noticed this morning erythema had worsened.   He states his swelling and pain continue to worsen. He denies injury, however there is a small scab on the bottom of his foot where it looks like he may have had a small puncture wound. Patient reports subjective fever/chills. The patient states that he has not been eating and drinking well due to generally not feeling well. Patient denies shortness of breath, chest pain, abdominal pain, N/V/D, urinary symptoms. Patient mated to hospitalist service for further management      Subjective:- (Last 24 hours)    Foot pain better  Able to walk on it right now  No fever no chills no nausea no vomiting    Past medical history, family history, social history and allergies reviewed again and is unchanged since admission. ROS (All review of systems completed. Pertinent positives noted. Otherwise All other systems reviewed and negative.)     Scheduled Meds:   sodium chloride flush  10 mL IntraVENous 2 times per day    enoxaparin  40 mg SubCUTAneous Q24H    piperacillin-tazobactam  3,375 mg IntraVENous Q8H    vancomycin (VANCOCIN) intermittent dosing (placeholder)   Other RX Placeholder    vancomycin  1,750 mg IntraVENous Q24H     Continuous Infusions:   sodium chloride 50 mL/hr at 03/28/22 1711    sodium chloride 100 mL/hr at 03/28/22 2022     PRN Meds:.sodium chloride flush, sodium chloride, ondansetron **OR** ondansetron, polyethylene glycol, acetaminophen **OR** acetaminophen, potassium chloride **OR** potassium alternative oral replacement **OR** potassium chloride, magnesium sulfate, HYDROcodone-acetaminophen     Diet:  ADULT DIET; Regular      Physical Exam:  /79   Pulse 82   Temp 97.8 °F (36.6 °C) (Oral)   Resp 16   Ht 5' 8\" (1.727 m)   Wt 211 lb (95.7 kg)   SpO2 98%   BMI 32.08 kg/m²   General:   Pleasant male. NAD. HEENT:  normocephalic and atraumatic. No scleral icterus. PERR. Neck: supple. No JVD. No thyromegaly. Lungs: clear to auscultation. No retractions  Cardiac: RRR without murmur. Abdomen: soft. Nontender.   Bowel sounds positive. Extremities:  No clubbing, cyanosis, or edema x 4. Vasculature: capillary refill < 3 seconds. Palpable LE pulses bilaterally. Skin:  warm and dry. No rashes or lesions. Psych:  Alert and oriented x3. Affect appropriate  Lymph:  No supraclavicular adenopathy. Neurologic:  No focal deficit. No seizures. Data: (All radiographs, tracings, PFTs, and imaging are personally viewed and interpreted unless otherwise noted)  Labs:   Recent Labs     03/28/22  1323 03/29/22  0636   WBC 7.8 5.6   HGB 12.3* 11.4*   HCT 37.9* 34.3*    188     Recent Labs     03/28/22  1323 03/29/22  0636    138   K 4.3 4.2   CL 97* 99   CO2 26 26   BUN 20 18   CREATININE 1.2 1.3*   CALCIUM 9.4 8.8     No results for input(s): AST, ALT, BILIDIR, BILITOT, ALKPHOS in the last 72 hours. No results for input(s): INR in the last 72 hours. No results for input(s): Chayito Jack in the last 72 hours. Urinalysis:   Lab Results   Component Value Date    NITRU NEGATIVE 03/28/2022    WBCUA 0-2 03/28/2022    BACTERIA NONE SEEN 03/28/2022    RBCUA NONE SEEN 03/28/2022    BLOODU NEGATIVE 03/28/2022    SPECGRAV 1.012 03/28/2022       EKG: No prior ECG    Radiology:  XR FOOT LEFT (MIN 3 VIEWS)   Final Result       1. No evidence of acute osseous abnormality of the left foot and ankle. 2. Calcaneal enthesophytes. **This report has been created using voice recognition software. It may contain minor errors which are inherent in voice recognition technology. **      Final report electronically signed by Dr. Gracie Barrios MD on 3/28/2022 1:36 PM      XR ANKLE LEFT (MIN 3 VIEWS)   Final Result       1. No evidence of acute osseous abnormality of the left foot and ankle. 2. Calcaneal enthesophytes. **This report has been created using voice recognition software. It may contain minor errors which are inherent in voice recognition technology. **      Final report electronically signed by Dr. Gracie Barrios MD on 3/28/2022 1:36 PM      US RENAL COMPLETE    (Results Pending)     XR ANKLE LEFT (MIN 3 VIEWS)    Result Date: 3/28/2022  PROCEDURE: XR ANKLE LEFT (MIN 3 VIEWS), XR FOOT LEFT (MIN 3 VIEWS) CLINICAL INFORMATION: pain. Pain and swelling in left foot and ankle for 6 days. No known injury. COMPARISON: No prior study. TECHNIQUE: 4 views of the left foot and 3 views of the left ankle. FINDINGS:  There is a suggestion of metatarsus primus varus and hallux valgus on these nonweightbearing views. There is otherwise anatomic vertebral body height and alignment. No fracture is evident. Joint spaces appear preserved. The ankle mortise appears intact. There is soft tissue swelling about the foot and ankle. There are calcaneal enthesophytes at the origin of the plantar fascia and insertion of Achilles tendon. 1. No evidence of acute osseous abnormality of the left foot and ankle. 2. Calcaneal enthesophytes. **This report has been created using voice recognition software. It may contain minor errors which are inherent in voice recognition technology. ** Final report electronically signed by Dr. Gracie Barrios MD on 3/28/2022 1:36 PM    XR FOOT LEFT (MIN 3 VIEWS)    Result Date: 3/28/2022  PROCEDURE: XR ANKLE LEFT (MIN 3 VIEWS), XR FOOT LEFT (MIN 3 VIEWS) CLINICAL INFORMATION: pain. Pain and swelling in left foot and ankle for 6 days. No known injury. COMPARISON: No prior study. TECHNIQUE: 4 views of the left foot and 3 views of the left ankle. FINDINGS:  There is a suggestion of metatarsus primus varus and hallux valgus on these nonweightbearing views. There is otherwise anatomic vertebral body height and alignment. No fracture is evident. Joint spaces appear preserved. The ankle mortise appears intact. There is soft tissue swelling about the foot and ankle. There are calcaneal enthesophytes at the origin of the plantar fascia and insertion of Achilles tendon.       1. No evidence of acute osseous abnormality of the left foot and ankle. 2. Calcaneal enthesophytes. **This report has been created using voice recognition software. It may contain minor errors which are inherent in voice recognition technology. ** Final report electronically signed by Dr. Junior Bourgeois MD on 3/28/2022 1:36 PM        Tele:   [] yes             [x] no      Thank you Lianna Sebastian MD for the opportunity to be involved in this patient's care.     Electronically signed by Severa Byers, MD on 3/29/2022 at 3:41 PM

## 2022-03-29 NOTE — CARE COORDINATION
3/29/22, 7:31 AM EDT  DISCHARGE PLANNING EVALUATION:    Marco Serrano       Admitted: 3/28/2022/ [address] [facility],[address] day: 0   Location: [address] [place] [address] Reason for admit: Cellulitis [L03.90]  Cellulitis of left lower extremity [C72.239]   PMH:  has a past medical history of Arthritis, History of blood transfusion, Hyperlipidemia, and Hypertension. Procedure:   XR left ankle   Impression:           1. No evidence of acute osseous abnormality of the left foot and ankle. 2. Calcaneal enthesophytes. XR Left foot    Impression:           1. No evidence of acute osseous abnormality of the left foot and ankle. 2. Calcaneal enthesophytes. Barriers to Discharge:  From ED. ID consult, IV fluids, Lovenox, IV Zosyn, IV Vancomycin, electrolyte replacement protocols, pain control. PCP: Mariposa Hauser MD   %    Patient Goals/Plan/Treatment Preferences: Met with Canelo Osullivan. He currently lives a home alone. He has family support. He is independent. Plan is to return home at discharge. He denies need for DME and declines HH. Will follow for potential needs. Transportation/Food Security/Housekeeping Addressed:  No issues identified.

## 2022-03-29 NOTE — PLAN OF CARE
Problem: Falls - Risk of:  Goal: Will remain free from falls  Description: Will remain free from falls  Outcome: Ongoing  Note: Call light, overbed table, and belongings within reach. Bed/ chair alarm activated. Patient included in hourly rounds. Goal: Absence of physical injury  Description: Absence of physical injury  Outcome: Ongoing     Problem: Mobility - Impaired:  Goal: Mobility will improve to maximum level  Description: Mobility will improve to maximum level  Outcome: Ongoing  Note: Patient is currently unable to ambulate,  pt/ot ordered     Problem: Pain:  Goal: Control of acute pain  Description: Control of acute pain  Outcome: Ongoing  Note: Patient is rating pain at 6/10 during this shift. Patient is taking norco to control pain. Goal: Control of chronic pain  Description: Control of chronic pain  Outcome: Ongoing     Problem: Skin Integrity - Impaired:  Goal: Will show no infection signs and symptoms  Description: Will show no infection signs and symptoms  Outcome: Ongoing  Note: Cellulitis to LLE, marked and not spreading. Afebrile this shift. Goal: Absence of new skin breakdown  Description: Absence of new skin breakdown  Outcome: Ongoing  Note: Patient is free from signs of skin breakdown during this shift. Patient is able to turn self in bed. LLE ace wrapped     Problem: Discharge Planning:  Goal: Discharged to appropriate level of care  Description: Discharged to appropriate level of care  Outcome: Ongoing  Note: Patient will return home at time of discharge. Patient does not have any questions at this time. Care plan reviewed with patient. Patient verbalize understanding of the plan of care and contribute to goal setting.

## 2022-03-30 LAB
ANION GAP SERPL CALCULATED.3IONS-SCNC: 12 MEQ/L (ref 8–16)
BUN BLDV-MCNC: 11 MG/DL (ref 7–22)
CALCIUM SERPL-MCNC: 8.8 MG/DL (ref 8.5–10.5)
CHLORIDE BLD-SCNC: 96 MEQ/L (ref 98–111)
CO2: 27 MEQ/L (ref 23–33)
CREAT SERPL-MCNC: 1.1 MG/DL (ref 0.4–1.2)
GFR SERPL CREATININE-BSD FRML MDRD: 68 ML/MIN/1.73M2
GLUCOSE BLD-MCNC: 110 MG/DL (ref 70–108)
POTASSIUM SERPL-SCNC: 4 MEQ/L (ref 3.5–5.2)
SODIUM BLD-SCNC: 135 MEQ/L (ref 135–145)

## 2022-03-30 PROCEDURE — 6360000002 HC RX W HCPCS: Performed by: PHYSICIAN ASSISTANT

## 2022-03-30 PROCEDURE — 80048 BASIC METABOLIC PNL TOTAL CA: CPT

## 2022-03-30 PROCEDURE — 2580000003 HC RX 258: Performed by: PHYSICIAN ASSISTANT

## 2022-03-30 PROCEDURE — 96375 TX/PRO/DX INJ NEW DRUG ADDON: CPT

## 2022-03-30 PROCEDURE — 36415 COLL VENOUS BLD VENIPUNCTURE: CPT

## 2022-03-30 PROCEDURE — 96372 THER/PROPH/DIAG INJ SC/IM: CPT

## 2022-03-30 PROCEDURE — 2580000003 HC RX 258

## 2022-03-30 PROCEDURE — 96366 THER/PROPH/DIAG IV INF ADDON: CPT

## 2022-03-30 PROCEDURE — 6360000002 HC RX W HCPCS

## 2022-03-30 PROCEDURE — 6370000000 HC RX 637 (ALT 250 FOR IP): Performed by: PHYSICIAN ASSISTANT

## 2022-03-30 PROCEDURE — G0378 HOSPITAL OBSERVATION PER HR: HCPCS

## 2022-03-30 PROCEDURE — 99226 PR SBSQ OBSERVATION CARE/DAY 35 MINUTES: CPT | Performed by: PHYSICIAN ASSISTANT

## 2022-03-30 RX ORDER — METHYLPREDNISOLONE SODIUM SUCCINATE 125 MG/2ML
60 INJECTION, POWDER, LYOPHILIZED, FOR SOLUTION INTRAMUSCULAR; INTRAVENOUS DAILY
Status: DISCONTINUED | OUTPATIENT
Start: 2022-03-30 | End: 2022-03-31 | Stop reason: HOSPADM

## 2022-03-30 RX ADMIN — PIPERACILLIN AND TAZOBACTAM 3375 MG: 3; .375 INJECTION, POWDER, LYOPHILIZED, FOR SOLUTION INTRAVENOUS at 00:41

## 2022-03-30 RX ADMIN — SODIUM CHLORIDE, PRESERVATIVE FREE 10 ML: 5 INJECTION INTRAVENOUS at 09:26

## 2022-03-30 RX ADMIN — METHYLPREDNISOLONE SODIUM SUCCINATE 60 MG: 125 INJECTION, POWDER, FOR SOLUTION INTRAMUSCULAR; INTRAVENOUS at 14:51

## 2022-03-30 RX ADMIN — SODIUM CHLORIDE: 9 INJECTION, SOLUTION INTRAVENOUS at 14:48

## 2022-03-30 RX ADMIN — HYDROCODONE BITARTRATE AND ACETAMINOPHEN 1 TABLET: 7.5; 325 TABLET ORAL at 16:12

## 2022-03-30 RX ADMIN — Medication 2000 MG: at 16:59

## 2022-03-30 RX ADMIN — HYDROCODONE BITARTRATE AND ACETAMINOPHEN 1 TABLET: 7.5; 325 TABLET ORAL at 00:01

## 2022-03-30 RX ADMIN — PIPERACILLIN AND TAZOBACTAM 3375 MG: 3; .375 INJECTION, POWDER, LYOPHILIZED, FOR SOLUTION INTRAVENOUS at 09:20

## 2022-03-30 RX ADMIN — ENOXAPARIN SODIUM 40 MG: 40 INJECTION SUBCUTANEOUS at 21:12

## 2022-03-30 RX ADMIN — HYDROCODONE BITARTRATE AND ACETAMINOPHEN 1 TABLET: 7.5; 325 TABLET ORAL at 09:22

## 2022-03-30 RX ADMIN — CYCLOBENZAPRINE 5 MG: 10 TABLET, FILM COATED ORAL at 00:01

## 2022-03-30 RX ADMIN — HYDROCODONE BITARTRATE AND ACETAMINOPHEN 1 TABLET: 7.5; 325 TABLET ORAL at 23:48

## 2022-03-30 ASSESSMENT — PAIN DESCRIPTION - ONSET
ONSET: ON-GOING

## 2022-03-30 ASSESSMENT — PAIN DESCRIPTION - LOCATION
LOCATION: FOOT

## 2022-03-30 ASSESSMENT — PAIN DESCRIPTION - PROGRESSION
CLINICAL_PROGRESSION: GRADUALLY WORSENING
CLINICAL_PROGRESSION: GRADUALLY IMPROVING
CLINICAL_PROGRESSION: GRADUALLY WORSENING

## 2022-03-30 ASSESSMENT — PAIN SCALES - GENERAL
PAINLEVEL_OUTOF10: 4
PAINLEVEL_OUTOF10: 6
PAINLEVEL_OUTOF10: 5
PAINLEVEL_OUTOF10: 7
PAINLEVEL_OUTOF10: 4
PAINLEVEL_OUTOF10: 4
PAINLEVEL_OUTOF10: 5
PAINLEVEL_OUTOF10: 5
PAINLEVEL_OUTOF10: 4

## 2022-03-30 ASSESSMENT — PAIN DESCRIPTION - DESCRIPTORS
DESCRIPTORS: ACHING
DESCRIPTORS: BURNING
DESCRIPTORS: ACHING

## 2022-03-30 ASSESSMENT — PAIN DESCRIPTION - ORIENTATION
ORIENTATION: RIGHT
ORIENTATION: LEFT
ORIENTATION: RIGHT

## 2022-03-30 ASSESSMENT — PAIN - FUNCTIONAL ASSESSMENT
PAIN_FUNCTIONAL_ASSESSMENT: ACTIVITIES ARE NOT PREVENTED
PAIN_FUNCTIONAL_ASSESSMENT: ACTIVITIES ARE NOT PREVENTED

## 2022-03-30 ASSESSMENT — PAIN DESCRIPTION - PAIN TYPE
TYPE: ACUTE PAIN

## 2022-03-30 ASSESSMENT — PAIN DESCRIPTION - FREQUENCY
FREQUENCY: CONTINUOUS

## 2022-03-30 NOTE — CARE COORDINATION
3/30/22, 9:05 AM EDT    DISCHARGE  [facility] day: 0  Location: 6E-57/057-A Reason for admit: Cellulitis [L03.90]  Cellulitis of left lower extremity [I18.124]   Procedure:   3/29 US Renal   Impression:        Unremarkable appearance of the kidneys. Simple cyst superior pole right kidney. Barriers to Discharge: ID consult, Lovenox, pain management, IV Zosyn, electrolyte replacement protocols. PCP: Sandra Silver MD   %  Patient Goals/Plan/Treatment Preferences: Plan is to return home with family support. Will follow.

## 2022-03-30 NOTE — PLAN OF CARE
Problem: Falls - Risk of:  Goal: Will remain free from falls  Description: Will remain free from falls  3/29/2022 2248 by Fransisco Garcia RN  Outcome: Ongoing  Note: Call light, overbed table, and belongings within reach. Bed/ chair alarm activated. Up with standby assist. Patient included in hourly rounds. Problem: Falls - Risk of:  Goal: Absence of physical injury  Description: Absence of physical injury  Outcome: Ongoing     Problem: Mobility - Impaired:  Goal: Mobility will improve to maximum level  Description: Mobility will improve to maximum level  3/29/2022 2248 by Fransisco Garcia RN  Outcome: Ongoing  Note: Patient is able to get out of bed and walk in the room independently     Problem: Pain:  Goal: Control of acute pain  Description: Control of acute pain  3/29/2022 2248 by Fransisco Garcia RN  Outcome: Ongoing  Note: Patient is rating pain at 7/10 during this shift. Patient is taking norco to control pain. Problem: Pain:  Goal: Control of chronic pain  Description: Control of chronic pain  Outcome: Ongoing     Problem: Skin Integrity - Impaired:  Goal: Will show no infection signs and symptoms  Description: Will show no infection signs and symptoms  3/29/2022 2248 by Fransisco Garcia RN  Outcome: Ongoing  Note: Patient on zosyn and vancomycin to control infection, afebrile this shift     Problem: Skin Integrity - Impaired:  Goal: Absence of new skin breakdown  Description: Absence of new skin breakdown  Outcome: Ongoing     Problem: Discharge Planning:  Goal: Discharged to appropriate level of care  Description: Discharged to appropriate level of care  3/29/2022 2248 by Fransisco Garcia RN  Outcome: Ongoing  Note: Patient will return home at time of discharge. Patient does not have any questions at this time. Care plan reviewed with patient. Patient verbalize understanding of the plan of care and contribute to goal setting.

## 2022-03-30 NOTE — FLOWSHEET NOTE
03/29/22 2234   Treatment Team Notification   Reason for Communication Patient/Family request   Team Member Name [id number] Team Role Physician Assistant   Method of Communication Secure Message   Response Waiting for response   Notification Time [id number]   Pt requesting something to help with his left hip/leg pain

## 2022-03-30 NOTE — PLAN OF CARE
Problem: Falls - Risk of:  Goal: Will remain free from falls  Description: Will remain free from falls  Outcome: Ongoing  Note: Pt remains free from falls this shift, call light within reach, up with walker, gripper socks on, hourly rounding continued. Problem: Pain:  Goal: Control of acute pain  Description: Control of acute pain  Outcome: Ongoing  Note: Pt rating pain 4/10 norco prn q 6 hours for pain, rest, repositioning, and elevation for foot pain. Problem: Skin Integrity - Impaired:  Goal: Will show no infection signs and symptoms  Description: Will show no infection signs and symptoms  Outcome: Ongoing  Note: Pt afebrile this shift, new right foot redness and swelling marked with pen. D/c iv zosyn and vanc. Iv ancef started per mar. Problem: Discharge Planning:  Goal: Discharged to appropriate level of care  Description: Discharged to appropriate level of care  Outcome: Ongoing  Note: Pt plans to be d/c home alone when medically appropriate, front wheel walker ordered no other needs voiced this shift.

## 2022-03-30 NOTE — PROGRESS NOTES
Hospitalist Progress Note      Patient:  Laural Bar    Unit/Bed:6E-57/057-A  YOB: 1965  MRN: [id number]   Acct: [de-identified]     PCP: Su José MD  Date of Admission: 3/28/2022        Chief Complaint: left foot pain    Assessment/Plan:    1. Left foot cellulitis vs acute gout flare vs enthesophytes, without sepsis: Pt was started on Kelfex and Cipro as outpatient 3 days ago. Erythema worsening. See media. ESR, CPR significantly elevated. Outline borders, apply ACE wrap.   a. He was started on Zosyn and Vanc in the ED, stop Vanc with neg MRSA by PCR.   b. Presents with elevated uric acid of 7.7.   c. Left foot XR reported calcaneal enthesophytes. d. However, difficult to assess if there is joint involvement with his significant swelling and erythema and pt denies hx gout.   e. Concern for polyarticular with R foot near first metatarsal with similar redness and pain setting in. Clinically consistent with gout, will trial steroid and assess response. 2. Elevated uric acid: As stated above. Will hydrate patient. Check UA. Repeat is improved. 3. LELA, resolved: Pt appears dry, reports poor po intake. S/p IVF, resolved. Monitor BMP. 4. Normocytic anemia: mild, no signs of acute bleeding. 5. Hyperglycemia: Mild. Check A1c. History of Present Illness: This is a 59year old male with PMHx HTN, HLD who presented to [facility] for evaluation of left foot pain and swelling. The patient reports redness and swelling starting 5 days ago. He states he was seen at OSH on 3/25 was diagnosed with cellulitis, sent home on oral Keflex and Cipro. Erythema was outlined at that time and patient was told to go to the ER if it spread beyond its borders. Patient states he noticed this morning erythema had worsened. He states his swelling and pain continue to worsen.  He denies injury, however there is a small scab on the bottom of his foot where it looks like he may have had a small puncture wound. Patient reports subjective fever/chills. The patient states that he has not been eating and drinking well due to generally not feeling well. Patient denies shortness of breath, chest pain, abdominal pain, N/V/D, urinary symptoms. Patient mated to hospitalist service for further management      Subjective:- (Last 24 hours)  3/30: Foot pain still significant, though improving mildly. Able to walk on it though with a walker. Denies current fever / chills , no nausea no vomiting but does not like the food. No CP/SOB. Past medical history, family history, social history and allergies reviewed again and is unchanged since admission. ROS (All review of systems completed. Pertinent positives noted. Otherwise All other systems reviewed and negative.)     Scheduled Meds:   ceFAZolin  2,000 mg IntraVENous Q8H    sodium chloride flush  10 mL IntraVENous 2 times per day    enoxaparin  40 mg SubCUTAneous Q24H     Continuous Infusions:   sodium chloride 50 mL/hr at 03/28/22 1711    sodium chloride 50 mL/hr at 03/30/22 1051     PRN Meds:.sodium chloride flush, sodium chloride, ondansetron **OR** ondansetron, polyethylene glycol, acetaminophen **OR** acetaminophen, potassium chloride **OR** potassium alternative oral replacement **OR** potassium chloride, magnesium sulfate, HYDROcodone-acetaminophen     Diet:  ADULT DIET; Regular      Physical Exam:  /87   Pulse 90   Temp 98.1 °F (36.7 °C) (Oral)   Resp 18   Ht 5' 8\" (1.727 m)   Wt 211 lb (95.7 kg)   SpO2 95%   BMI 32.08 kg/m²   General:   Pleasant male. NAD. HEENT:  normocephalic and atraumatic. No scleral icterus. PERR. Neck: supple. No JVD. No thyromegaly. Lungs: clear to auscultation. No retractions  Cardiac: RRR without murmur. Abdomen: soft. Nontender. Bowel sounds positive. Extremities:  No clubbing, cyanosis, or edema x 4. Vasculature: capillary refill < 3 seconds. Palpable LE pulses bilaterally. Skin:  warm and dry.  No rashes or lesions. Psych:  Alert and oriented x3. Affect appropriate  Lymph:  No supraclavicular adenopathy. Neurologic:  No focal deficit. No seizures. Data: (All radiographs, tracings, PFTs, and imaging are personally viewed and interpreted unless otherwise noted)  Labs:   Recent Labs     03/28/22  1323 03/29/22  0636   WBC 7.8 5.6   HGB 12.3* 11.4*   HCT 37.9* 34.3*    188     Recent Labs     03/28/22  1323 03/29/22  0636 03/30/22  1048    138 135   K 4.3 4.2 4.0   CL 97* 99 96*   CO2 26 26 27   BUN 20 18 11   CREATININE 1.2 1.3* 1.1   CALCIUM 9.4 8.8 8.8     No results for input(s): AST, ALT, BILIDIR, BILITOT, ALKPHOS in the last 72 hours. No results for input(s): INR in the last 72 hours. No results for input(s): Jadene Moh in the last 72 hours. Urinalysis:   Lab Results   Component Value Date    NITRU NEGATIVE 03/28/2022    WBCUA 0-2 03/28/2022    BACTERIA NONE SEEN 03/28/2022    RBCUA NONE SEEN 03/28/2022    BLOODU NEGATIVE 03/28/2022    SPECGRAV 1.012 03/28/2022       EKG: No prior ECG    Radiology:  US RENAL COMPLETE   Final Result   Unremarkable appearance of the kidneys. Simple cyst superior pole right kidney. **This report has been created using voice recognition software. It may contain minor errors which are inherent in voice recognition technology. **      Final report electronically signed by Dr. Zaheer Paez on 3/29/2022 4:46 PM      XR FOOT LEFT (MIN 3 VIEWS)   Final Result       1. No evidence of acute osseous abnormality of the left foot and ankle. 2. Calcaneal enthesophytes. **This report has been created using voice recognition software. It may contain minor errors which are inherent in voice recognition technology. **      Final report electronically signed by Dr. Amy Copeland MD on 3/28/2022 1:36 PM      XR ANKLE LEFT (MIN 3 VIEWS)   Final Result       1. No evidence of acute osseous abnormality of the left foot and ankle.    2. Calcaneal enthesophytes. **This report has been created using voice recognition software. It may contain minor errors which are inherent in voice recognition technology. **      Final report electronically signed by Dr. Eliane Sam MD on 3/28/2022 1:36 PM        XR ANKLE LEFT (MIN 3 VIEWS)    Result Date: 3/28/2022  PROCEDURE: XR ANKLE LEFT (MIN 3 VIEWS), XR FOOT LEFT (MIN 3 VIEWS) CLINICAL INFORMATION: pain. Pain and swelling in left foot and ankle for 6 days. No known injury. COMPARISON: No prior study. TECHNIQUE: 4 views of the left foot and 3 views of the left ankle. FINDINGS:  There is a suggestion of metatarsus primus varus and hallux valgus on these nonweightbearing views. There is otherwise anatomic vertebral body height and alignment. No fracture is evident. Joint spaces appear preserved. The ankle mortise appears intact. There is soft tissue swelling about the foot and ankle. There are calcaneal enthesophytes at the origin of the plantar fascia and insertion of Achilles tendon. 1. No evidence of acute osseous abnormality of the left foot and ankle. 2. Calcaneal enthesophytes. **This report has been created using voice recognition software. It may contain minor errors which are inherent in voice recognition technology. ** Final report electronically signed by Dr. Eliane Sam MD on 3/28/2022 1:36 PM    XR FOOT LEFT (MIN 3 VIEWS)    Result Date: 3/28/2022  PROCEDURE: XR ANKLE LEFT (MIN 3 VIEWS), XR FOOT LEFT (MIN 3 VIEWS) CLINICAL INFORMATION: pain. Pain and swelling in left foot and ankle for 6 days. No known injury. COMPARISON: No prior study. TECHNIQUE: 4 views of the left foot and 3 views of the left ankle. FINDINGS:  There is a suggestion of metatarsus primus varus and hallux valgus on these nonweightbearing views. There is otherwise anatomic vertebral body height and alignment. No fracture is evident. Joint spaces appear preserved. The ankle mortise appears intact. There is soft tissue swelling about the foot and ankle. There are calcaneal enthesophytes at the origin of the plantar fascia and insertion of Achilles tendon. 1. No evidence of acute osseous abnormality of the left foot and ankle. 2. Calcaneal enthesophytes. **This report has been created using voice recognition software. It may contain minor errors which are inherent in voice recognition technology. ** Final report electronically signed by Dr. Stephanie Mosley MD on 3/28/2022 1:36 PM        Tele:   [] yes             [x] no      Thank you Ilan Hudson MD for the opportunity to be involved in this patient's care.     Electronically signed by Annalee Devries PA-C on 3/30/2022 at 2:03 PM

## 2022-03-31 VITALS
SYSTOLIC BLOOD PRESSURE: 135 MMHG | BODY MASS INDEX: 31.98 KG/M2 | RESPIRATION RATE: 18 BRPM | OXYGEN SATURATION: 94 % | WEIGHT: 211 LBS | DIASTOLIC BLOOD PRESSURE: 85 MMHG | HEART RATE: 84 BPM | TEMPERATURE: 97.7 F | HEIGHT: 68 IN

## 2022-03-31 PROCEDURE — 96376 TX/PRO/DX INJ SAME DRUG ADON: CPT

## 2022-03-31 PROCEDURE — 99217 PR OBSERVATION CARE DISCHARGE MANAGEMENT: CPT | Performed by: PHYSICIAN ASSISTANT

## 2022-03-31 PROCEDURE — G0378 HOSPITAL OBSERVATION PER HR: HCPCS

## 2022-03-31 PROCEDURE — 2580000003 HC RX 258: Performed by: PHYSICIAN ASSISTANT

## 2022-03-31 PROCEDURE — 94760 N-INVAS EAR/PLS OXIMETRY 1: CPT

## 2022-03-31 PROCEDURE — 6360000002 HC RX W HCPCS: Performed by: PHYSICIAN ASSISTANT

## 2022-03-31 RX ORDER — METHYLPREDNISOLONE 4 MG/1
TABLET ORAL
Qty: 1 KIT | Refills: 0 | Status: SHIPPED | OUTPATIENT
Start: 2022-03-31

## 2022-03-31 RX ADMIN — Medication 2000 MG: at 09:09

## 2022-03-31 RX ADMIN — SODIUM CHLORIDE, PRESERVATIVE FREE 10 ML: 5 INJECTION INTRAVENOUS at 09:11

## 2022-03-31 RX ADMIN — SODIUM CHLORIDE: 9 INJECTION, SOLUTION INTRAVENOUS at 10:10

## 2022-03-31 RX ADMIN — Medication 2000 MG: at 00:31

## 2022-03-31 RX ADMIN — METHYLPREDNISOLONE SODIUM SUCCINATE 60 MG: 125 INJECTION, POWDER, FOR SOLUTION INTRAMUSCULAR; INTRAVENOUS at 09:10

## 2022-03-31 ASSESSMENT — PAIN DESCRIPTION - DESCRIPTORS: DESCRIPTORS_2: TENDER

## 2022-03-31 ASSESSMENT — PAIN DESCRIPTION - LOCATION
LOCATION_2: FOOT
LOCATION: HIP
LOCATION: HIP

## 2022-03-31 ASSESSMENT — PAIN DESCRIPTION - PAIN TYPE
TYPE: CHRONIC PAIN
TYPE: CHRONIC PAIN
TYPE_2: ACUTE PAIN

## 2022-03-31 ASSESSMENT — PAIN DESCRIPTION - ORIENTATION
ORIENTATION: LEFT
ORIENTATION: LEFT
ORIENTATION_2: RIGHT;LEFT

## 2022-03-31 ASSESSMENT — PAIN SCALES - GENERAL
PAINLEVEL_OUTOF10: 5
PAINLEVEL_OUTOF10: 5
PAINLEVEL_OUTOF10: 4

## 2022-03-31 ASSESSMENT — PAIN DESCRIPTION - INTENSITY: RATING_2: 4

## 2022-03-31 NOTE — DISCHARGE SUMMARY
Hospitalist Discharge Summary        Patient: Nicholas Friday  YOB: 1965  MRN: [id number]   Acct: [de-identified]    Primary Care Physician: Robby Villavicencio MD    Admit date  3/28/2022    Discharge date: No discharge date for patient encounter. Chief Complaint on presentation :-  left foot pain    Discharge Assessment and Plan:-   1. Left foot erythema secondary to acute gout flare: initial concern for cellulitis without sepsis, however pain nor erythema greatly improved with abx. Pt was started on Klefex and Cipro as outpatient 3 days ago, discontinue at discharge. a. Presented with elevated uric acid of 7.7 which has improved   b. Left foot XR reported calcaneal enthesophytes. c. Concern for polyarticular with R foot near first metatarsal with similar redness and pain setting in. Clinically consistent with gout. Improved s/p IV steroids. Discharge with Medrol dose Gregor.  d. F/u with PCP for maintenance therapy.      2. Elevated uric acid: As stated above. Will hydrate patient. Check UA. Repeat is improved.        3. LELA, resolved: Pt appears dry, reports poor po intake. S/p IVF, resolved. Monitor BMP.     4. Normocytic anemia: mild, no signs of acute bleeding.     5. Hyperglycemia: Mild. Check A1c -. 5.2%.       Initial H and P and Hospital course:-  This is a 59year old male with PMHx HTN, HLD who presented to [facility] for evaluation of left foot pain and swelling. The patient reports redness and swelling starting 5 days ago. He states he was seen at OSH on 3/25 was diagnosed with cellulitis, sent home on oral Keflex and Cipro. Erythema was outlined at that time and patient was told to go to the ER if it spread beyond its borders. Patient states he noticed this morning erythema had worsened. He states his swelling and pain continue to worsen. He denies injury, however there is a small scab on the bottom of his foot where it looks like he may have had a small puncture wound.  Patient reports subjective fever/chills. The patient states that he has not been eating and drinking well due to generally not feeling well. Patient denies shortness of breath, chest pain, abdominal pain, N/V/D, urinary symptoms. Patient mated to hospitalist service for further management     3/30: Foot pain still significant, though improving mildly. Able to walk on it though with a walker. Denies current fever / chills , no nausea no vomiting but does not like the food. No CP/SOB. Patient was admitted secondary to left foot erythema and pain. Patient did not exhibit signs of sepsis however it was felt that this could potentially be cellulitis. Patient had failed outpatient oral antibiotics and stated that these did not improve his symptoms at all, even interfering with patient's ambulation. Upon admission, patient was continued on IV antibiotics and started on IV fluids. He was noted to have an elevated uric acid level and during his admission reported that he felt the same presentation was starting to occur on his right sided first metatarsal area. Given this presentation, there was more of a a concern for gout and patient was given a 60 mg dose of Solu-Medrol IV. After this, patient states that he felt much better and was \"able to actually walk \". Patient was given a Medrol dose pack at discharge and instructed to follow-up with his PCP in regards to maintenance therapy for gout. Additionally, patient was instructed to discuss with his PCP regarding his BP medications as he stated he felt he was not sure he needed them. All VSS and suitable for discharge home.     Physical Exam:-  Vitals:   Patient Vitals for the past 24 hrs:   BP Temp Temp src Pulse Resp SpO2   03/31/22 1443 -- -- -- -- 18 94 %   03/31/22 1132 135/85 97.7 °F (36.5 °C) Oral 84 20 94 %   03/31/22 0742 127/86 98.1 °F (36.7 °C) Oral 87 16 99 %   03/31/22 0348 (!) 129/93 97.4 °F (36.3 °C) Oral 88 18 98 %   03/30/22 2002 129/87 98.4 °F (36.9 °C) Oral 91 16 93 %   03/30/22 1523 126/86 98.9 °F (37.2 °C) Oral 90 18 97 %     Weight:   Weight: 211 lb (95.7 kg)   24 hour intake/output:     Intake/Output Summary (Last 24 hours) at 3/31/2022 1515  Last data filed at 3/31/2022 1410  Gross per 24 hour   Intake 1634.96 ml   Output 575 ml   Net 1059.96 ml       1. General appearance: Pleasant, no apparent distress, appears stated age and cooperative. 2. HEENT: Normal cephalic, atraumatic without obvious deformity. Pupils equal, round, and reactive to light. Extra ocular muscles intact. Conjunctivae/corneas clear. 3. Neck: Supple, with full range of motion. No jugular venous distention. Trachea midline. 4. Respiratory:  Normal respiratory effort. Clear to auscultation, bilaterally without Rales/Wheezes/Rhonchi. 5. Cardiovascular: Regular rate and rhythm with normal S1/S2 without murmurs, rubs or gallops. 6. Abdomen: Soft, non-tender, non-distended with normal bowel sounds. 7. Musculoskeletal:  No clubbing, cyanosis or edema bilaterally. 8. Skin: Erythema to left first metatarsal region, improving swelling greatly improved. Mild erythema noted to right first metatarsal.  9. Neurologic:  Neurovascularly intact without any focal sensory/motor deficits. Cranial nerves: II-XII intact, grossly non-focal.  10. Psychiatric: Alert and oriented, thought content appropriate, normal insight  11. Capillary Refill: Brisk,< 3 seconds   12. Peripheral Pulses: +2 palpable, equal bilaterally       Discharge Medications:-      Medication List      START taking these medications    methylPREDNISolone 4 MG tablet  Commonly known as: MEDROL DOSEPACK  Take by mouth.         CONTINUE taking these medications    amLODIPine 5 MG tablet  Commonly known as: NORVASC     atorvastatin 20 MG tablet  Commonly known as: LIPITOR     HYDROcodone-acetaminophen 5-325 MG per tablet  Commonly known as: NORCO     lisinopril-hydroCHLOROthiazide 20-25 MG per tablet  Commonly known as: PRINZIDE;ZESTORETIC metoprolol 100 MG tablet  Commonly known as: LOPRESSOR     MULTI-DAY PO     omeprazole 20 MG delayed release capsule  Commonly known as: PRILOSEC        STOP taking these medications    cephALEXin 500 MG capsule  Commonly known as: KEFLEX     ciprofloxacin 250 MG tablet  Commonly known as: CIPRO           Where to Get Your Medications      These medications were sent to [facility] [address], [address]  [address] [place] [address] [address], [address] [address]    Phone: [phone]   · methylPREDNISolone 4 MG tablet          Labs :-  Recent Results (from the past 72 hour(s))   Urinalysis with Microscopic    Collection Time: 03/28/22  6:30 PM   Result Value Ref Range    Glucose, Urine NEGATIVE NEGATIVE mg/dl    Bilirubin Urine NEGATIVE NEGATIVE    Ketones, Urine NEGATIVE NEGATIVE    Specific Gravity, UA 1.012 1.002 - 1.030    Blood, Urine NEGATIVE NEGATIVE    pH, UA 5.0 5.0 - 9.0    Protein, UA NEGATIVE NEGATIVE mg/dl    Urobilinogen, Urine 0.2 0.0 - 1.0 eu/dl    Nitrite, Urine NEGATIVE NEGATIVE    Leukocyte Esterase, Urine NEGATIVE NEGATIVE    Color, UA YELLOW YELLOW-STRAW    Character, Urine CLEAR CLR-SL.CLOUD    RBC, UA NONE SEEN 0-2/hpf /hpf    WBC, UA 0-2 0-4/hpf /hpf    Epithelial Cells, UA NONE SEEN 3-5/hpf /hpf    Bacteria, UA NONE SEEN FEW/NONE SEEN    Casts NONE SEEN NONE SEEN /lpf    Crystals NONE SEEN NONE SEEN    Renal Epithelial, UA NONE SEEN NONE SEEN    Yeast, UA NONE SEEN NONE SEEN    Casts NONE SEEN /lpf    Miscellaneous Lab Test Result NONE SEEN    MRSA by PCR    Collection Time: 03/28/22  6:30 PM   Result Value Ref Range    MRSA SCREEN RT-PCR NEGATIVE    Basic Metabolic Panel w/ Reflex to MG    Collection Time: 03/29/22  6:36 AM   Result Value Ref Range    Sodium 138 135 - 145 meq/L    Potassium reflex Magnesium 4.2 3.5 - 5.2 meq/L    Chloride 99 98 - 111 meq/L    CO2 26 23 - 33 meq/L    Glucose 112 (H) 70 - 108 mg/dL    BUN 18 7 - 22 mg/dL    CREATININE 1.3 (H) 0.4 - 1.2 mg/dL    Calcium 8.8 8.5 - 10.5 mg/dL   CBC with Auto Differential    Collection Time: 03/29/22  6:36 AM   Result Value Ref Range    WBC 5.6 4.8 - 10.8 thou/mm3    RBC 3.63 (L) 4.70 - 6.10 mill/mm3    Hemoglobin 11.4 (L) 14.0 - 18.0 gm/dl    Hematocrit 34.3 (L) 42.0 - 52.0 %    MCV 94.5 (H) 80.0 - 94.0 fL    MCH 31.4 26.0 - 33.0 pg    MCHC 33.2 32.2 - 35.5 gm/dl    RDW-CV 12.6 11.5 - 14.5 %    RDW-SD 43.5 35.0 - 45.0 fL    Platelets 530 488 - 190 thou/mm3    MPV 9.8 9.4 - 12.4 fL    Seg Neutrophils 64.5 %    Lymphocytes 19.6 %    Monocytes 10.6 %    Eosinophils 4.5 %    Basophils 0.4 %    Immature Granulocytes 0.4 %    Segs Absolute 3.6 1.8 - 7.7 thou/mm3    Lymphocytes Absolute 1.1 1.0 - 4.8 thou/mm3    Monocytes Absolute 0.6 0.4 - 1.3 thou/mm3    Eosinophils Absolute 0.3 0.0 - 0.4 thou/mm3    Basophils Absolute 0.0 0.0 - 0.1 thou/mm3    Immature Grans (Abs) 0.02 0.00 - 0.07 thou/mm3    nRBC 0 /100 wbc   Uric Acid    Collection Time: 03/29/22  6:36 AM   Result Value Ref Range    Uric Acid 6.8 3.7 - 7.0 mg/dL   Hemoglobin A1C    Collection Time: 03/29/22  6:36 AM   Result Value Ref Range    Hemoglobin A1C 5.2 4.4 - 6.4 %    AVERAGE GLUCOSE 96 70 - 126 mg/dL   Anion Gap    Collection Time: 03/29/22  6:36 AM   Result Value Ref Range    Anion Gap 13.0 8.0 - 16.0 meq/L   Glomerular Filtration Rate, Estimated    Collection Time: 03/29/22  6:36 AM   Result Value Ref Range    Est, Glom Filt Rate 56 (A) VE/LPK/2.97C0   Basic Metabolic Panel    Collection Time: 03/30/22 10:48 AM   Result Value Ref Range    Sodium 135 135 - 145 meq/L    Potassium 4.0 3.5 - 5.2 meq/L    Chloride 96 (L) 98 - 111 meq/L    CO2 27 23 - 33 meq/L    Glucose 110 (H) 70 - 108 mg/dL    BUN 11 7 - 22 mg/dL    CREATININE 1.1 0.4 - 1.2 mg/dL    Calcium 8.8 8.5 - 10.5 mg/dL   Anion Gap    Collection Time: 03/30/22 10:48 AM   Result Value Ref Range    Anion Gap 12.0 8.0 - 16.0 meq/L   Glomerular Filtration Rate, Estimated    Collection Time: 03/30/22 10:48 AM   Result Value Ref Range    Est, Glom Filt Rate 68 (A) ml/min/1.73m2        Microbiology:    Blood culture #1: No results found for: BC    Blood culture #2:No results found for: BLOODCULT2    Organism:  No results found for: LABGRAM    MRSA culture only:No results found for: [address]    Urine culture: No results found for: LABURIN  No results found for: ORG     Respiratory culture: No results found for: CULTRESP    Aerobic and Anaerobic :  No results found for: LABAERO  No results found for: LABANAE    Urinalysis:      Lab Results   Component Value Date    NITRU NEGATIVE 03/28/2022    WBCUA 0-2 03/28/2022    BACTERIA NONE SEEN 03/28/2022    RBCUA NONE SEEN 03/28/2022    BLOODU NEGATIVE 03/28/2022    SPECGRAV 1.012 03/28/2022       Radiology:-  XR ANKLE LEFT (MIN 3 VIEWS)    Result Date: 3/28/2022  PROCEDURE: XR ANKLE LEFT (MIN 3 VIEWS), XR FOOT LEFT (MIN 3 VIEWS) CLINICAL INFORMATION: pain. Pain and swelling in left foot and ankle for 6 days. No known injury. COMPARISON: No prior study. TECHNIQUE: 4 views of the left foot and 3 views of the left ankle. FINDINGS:  There is a suggestion of metatarsus primus varus and hallux valgus on these nonweightbearing views. There is otherwise anatomic vertebral body height and alignment. No fracture is evident. Joint spaces appear preserved. The ankle mortise appears intact. There is soft tissue swelling about the foot and ankle. There are calcaneal enthesophytes at the origin of the plantar fascia and insertion of Achilles tendon. 1. No evidence of acute osseous abnormality of the left foot and ankle. 2. Calcaneal enthesophytes. **This report has been created using voice recognition software. It may contain minor errors which are inherent in voice recognition technology. ** Final report electronically signed by Dr. Nancy Elkins MD on 3/28/2022 1:36 PM    XR FOOT LEFT (MIN 3 VIEWS)    Result Date: 3/28/2022  PROCEDURE: XR ANKLE LEFT (MIN 3 VIEWS), XR FOOT LEFT (MIN 3 VIEWS) CLINICAL INFORMATION: pain. Pain and swelling in left foot and ankle for 6 days. No known injury. COMPARISON: No prior study. TECHNIQUE: 4 views of the left foot and 3 views of the left ankle. FINDINGS:  There is a suggestion of metatarsus primus varus and hallux valgus on these nonweightbearing views. There is otherwise anatomic vertebral body height and alignment. No fracture is evident. Joint spaces appear preserved. The ankle mortise appears intact. There is soft tissue swelling about the foot and ankle. There are calcaneal enthesophytes at the origin of the plantar fascia and insertion of Achilles tendon. 1. No evidence of acute osseous abnormality of the left foot and ankle. 2. Calcaneal enthesophytes. **This report has been created using voice recognition software. It may contain minor errors which are inherent in voice recognition technology. ** Final report electronically signed by Dr. Stephanie Mosley MD on 3/28/2022 1:36 PM    US RENAL COMPLETE    Result Date: 3/29/2022  PROCEDURE: US RENAL COMPLETE CLINICAL INFORMATION: ayana . TECHNIQUE: Grayscale and color Doppler ultrasound COMPARISON: No prior study. FINDINGS: Right: Right kidney is normal in size and echogenicity. There is no hydronephrosis or renal cortical thinning. There is a 2.4 x 2.6 x 2.4 cm cyst upper pole. Resistive index is normal. Left: Left kidney is normal in size and echogenicity with no hydronephrosis or renal cortical thinning. No solid or cystic mass. Resistive index is normal. Patient declined to void so no post void images of the bladder are available Prevoid bladder was not significantly distended but bilateral bladder jets are identified.  RIGHT KIDNEY - 10.6 x 5.8 x 6.1 cm Resistive Index - 0.62 Cortical Thickness - 1.5 cm LEFT KIDNEY - 11.9 x 4.8 x 5.6 cm Resistive Index - 0.55 Cortical Thickness - 1.5 cm URINARY BLADDER Pre-Void - 42 mL Post-Void - declined; voided prior to exam     Unremarkable appearance of the kidneys. Simple cyst superior pole right kidney. **This report has been created using voice recognition software. It may contain minor errors which are inherent in voice recognition technology. ** Final report electronically signed by Dr. Chuck Gomez on 3/29/2022 4:46 PM       Follow-up scheduled after discharge :-    in the next few days with Margarita Johnston MD      Consultations during this hospital stay:-  [x] NONE [] Cardiology  [] Nephrology  [] Hemo onco   [] GI   [] ID  [] Endocrine  [] Pulm    [] Neuro    [] Psych   [] Urology  [] ENT   [] G SURGERY   []Ortho    []CV surg    [] Palliative  [] Hospice [] Pain management   []    []TCU   [] PT/OT  OTHERS:-    Disposition: home  Condition at Discharge: Stable    Time Spent:- 40 minutes    Electronically signed by Rudolph Avalos PA-C on 3/31/22 at 3:15 PM EDT   Discharging Hospitalist

## 2022-03-31 NOTE — PLAN OF CARE
Problem: Falls - Risk of:  Goal: Will remain free from falls  Description: Will remain free from falls  3/30/2022 2313 by Domitila Lind RN  Outcome: Ongoing  Note: Call light, overbed table, and belongings within reach. Bed/ chair alarm activated. Up with no assist. Patient included in hourly rounds. Problem: Falls - Risk of:  Goal: Absence of physical injury  Description: Absence of physical injury  3/30/2022 2313 by Domitila Lind RN  Outcome: Ongoing     Problem: Mobility - Impaired:  Goal: Mobility will improve to maximum level  Description: Mobility will improve to maximum level  3/30/2022 2313 by Domitila Lind RN  Outcome: Met This Shift  Note: Patient went from being bed bound to being able to walk in the halls independently     Problem: Pain:  Goal: Control of acute pain  Description: Control of acute pain  3/30/2022 2313 by Domitila Lind RN  Outcome: Ongoing  Note: Patient is rating pain at 5/10 during this shift. Patient is taking norco to control pain. Problem: Pain:  Goal: Control of chronic pain  Description: Control of chronic pain  3/30/2022 2313 by Domitila Lind RN  Outcome: Ongoing     Problem: Skin Integrity - Impaired:  Goal: Will show no infection signs and symptoms  Description: Will show no infection signs and symptoms  3/30/2022 2313 by Domitila Lind RN  Outcome: Ongoing     Problem: Skin Integrity - Impaired:  Goal: Absence of new skin breakdown  Description: Absence of new skin breakdown  3/30/2022 2313 by Domitila Lind RN  Outcome: Ongoing  Note: Patient is free from signs of skin breakdown during this shift. Patient is able to turn self in bed. Problem: Discharge Planning:  Goal: Discharged to appropriate level of care  Description: Discharged to appropriate level of care  3/30/2022 2313 by Domitila Lind RN  Outcome: Ongoing  Note: Patient will return home at time of discharge. Patient does not have any questions at this time. Care plan reviewed with patient. Patient verbalize understanding of the plan of care and contribute to goal setting.

## 2022-03-31 NOTE — DISCHARGE INSTR - DIET
Good nutrition is important when healing from an illness, injury, or surgery. Follow any nutrition recommendations given to you during your hospital stay. If you were given an oral nutrition supplement while in the hospital, continue to take this supplement at home. You can take it with meals, in-between meals, and/or before bedtime. These supplements can be purchased at most local grocery stores, pharmacies, and chain [employer]-stores. If you have any questions about your diet or nutrition, call the hospital and ask for the dietitian.   Regular diet as tolerated, drink extra water

## 2022-03-31 NOTE — CARE COORDINATION
3/31/22, 8:40 AM EDT    DISCHARGE ON GOING EVALUATION    Avalos Raspberry day: 0  Location: [id number] Reason for admit: Cellulitis [L03.90]  Cellulitis of left lower extremity [G86.304]   Procedure: none  Barriers to Discharge: IV Ancef, pain control, Lovenox, IV Solu Medrol, electrolyte replacement protocols. PCP: Leeland Cowden, MD   %  Patient Goals/Plan/Treatment Preferences: Plan is home alone, family support.

## 2022-03-31 NOTE — CARE COORDINATION
Randy Sigala requires the assistance of a rolling walker to successfully complete daily living tasks such as: bathing, toileting, dressing and grooming. A rolling walker is necessary due to the patient's impaired ambulation and mobility restrictions, and can ambulate only by using a walker instead of a lesser assistive device, such as a cane or crutch.       Electronically signed by Vel Watson RN on 3/31/2022 at 8:44 AM

## 2022-04-17 ENCOUNTER — HOSPITAL ENCOUNTER (EMERGENCY)
Age: 57
Discharge: HOME | End: 2022-04-17
Attending: EMERGENCY MEDICINE
Payer: COMMERCIAL

## 2022-04-17 ENCOUNTER — APPOINTMENT (OUTPATIENT)
Dept: GENERAL RADIOLOGY | Age: 57
End: 2022-04-17
Payer: COMMERCIAL

## 2022-04-17 VITALS
HEART RATE: 91 BPM | SYSTOLIC BLOOD PRESSURE: 118 MMHG | RESPIRATION RATE: 17 BRPM | OXYGEN SATURATION: 98 % | BODY MASS INDEX: 32.13 KG/M2 | WEIGHT: 212 LBS | TEMPERATURE: 98.8 F | HEIGHT: 68 IN | DIASTOLIC BLOOD PRESSURE: 87 MMHG

## 2022-04-17 DIAGNOSIS — M10.09 ACUTE IDIOPATHIC GOUT OF MULTIPLE SITES: Primary | ICD-10-CM

## 2022-04-17 LAB
ANION GAP SERPL CALCULATED.3IONS-SCNC: 15 MEQ/L (ref 8–16)
BASOPHILS # BLD: 0.1 %
BASOPHILS ABSOLUTE: 0 THOU/MM3 (ref 0–0.1)
BUN BLDV-MCNC: 19 MG/DL (ref 7–22)
C-REACTIVE PROTEIN: 8.56 MG/DL (ref 0–1)
CALCIUM SERPL-MCNC: 9.8 MG/DL (ref 8.5–10.5)
CHLORIDE BLD-SCNC: 95 MEQ/L (ref 98–111)
CO2: 24 MEQ/L (ref 23–33)
CREAT SERPL-MCNC: 1.2 MG/DL (ref 0.4–1.2)
EOSINOPHIL # BLD: 1.2 %
EOSINOPHILS ABSOLUTE: 0.1 THOU/MM3 (ref 0–0.4)
ERYTHROCYTE [DISTWIDTH] IN BLOOD BY AUTOMATED COUNT: 13.1 % (ref 11.5–14.5)
ERYTHROCYTE [DISTWIDTH] IN BLOOD BY AUTOMATED COUNT: 44.4 FL (ref 35–45)
GFR SERPL CREATININE-BSD FRML MDRD: 62 ML/MIN/1.73M2
GLUCOSE BLD-MCNC: 105 MG/DL (ref 70–108)
HCT VFR BLD CALC: 39.3 % (ref 42–52)
HEMOGLOBIN: 13 GM/DL (ref 14–18)
IMMATURE GRANS (ABS): 0.03 THOU/MM3 (ref 0–0.07)
IMMATURE GRANULOCYTES: 0.4 %
LYMPHOCYTES # BLD: 14.5 %
LYMPHOCYTES ABSOLUTE: 1.2 THOU/MM3 (ref 1–4.8)
MCH RBC QN AUTO: 30.8 PG (ref 26–33)
MCHC RBC AUTO-ENTMCNC: 33.1 GM/DL (ref 32.2–35.5)
MCV RBC AUTO: 93.1 FL (ref 80–94)
MONOCYTES # BLD: 10.2 %
MONOCYTES ABSOLUTE: 0.9 THOU/MM3 (ref 0.4–1.3)
NUCLEATED RED BLOOD CELLS: 0 /100 WBC
OSMOLALITY CALCULATION: 270.9 MOSMOL/KG (ref 275–300)
PLATELET # BLD: 196 THOU/MM3 (ref 130–400)
PMV BLD AUTO: 10.1 FL (ref 9.4–12.4)
POTASSIUM REFLEX MAGNESIUM: 4.3 MEQ/L (ref 3.5–5.2)
RBC # BLD: 4.22 MILL/MM3 (ref 4.7–6.1)
SEDIMENTATION RATE, ERYTHROCYTE: 81 MM/HR (ref 0–10)
SEG NEUTROPHILS: 73.6 %
SEGMENTED NEUTROPHILS ABSOLUTE COUNT: 6.2 THOU/MM3 (ref 1.8–7.7)
SODIUM BLD-SCNC: 134 MEQ/L (ref 135–145)
URIC ACID: 7.3 MG/DL (ref 3.7–7)
WBC # BLD: 8.4 THOU/MM3 (ref 4.8–10.8)

## 2022-04-17 PROCEDURE — 73502 X-RAY EXAM HIP UNI 2-3 VIEWS: CPT

## 2022-04-17 PROCEDURE — 85025 COMPLETE CBC W/AUTO DIFF WBC: CPT

## 2022-04-17 PROCEDURE — 84550 ASSAY OF BLOOD/URIC ACID: CPT

## 2022-04-17 PROCEDURE — 85651 RBC SED RATE NONAUTOMATED: CPT

## 2022-04-17 PROCEDURE — 99283 EMERGENCY DEPT VISIT LOW MDM: CPT

## 2022-04-17 PROCEDURE — 80048 BASIC METABOLIC PNL TOTAL CA: CPT

## 2022-04-17 PROCEDURE — 96360 HYDRATION IV INFUSION INIT: CPT

## 2022-04-17 PROCEDURE — 36415 COLL VENOUS BLD VENIPUNCTURE: CPT

## 2022-04-17 PROCEDURE — 86140 C-REACTIVE PROTEIN: CPT

## 2022-04-17 PROCEDURE — 6370000000 HC RX 637 (ALT 250 FOR IP): Performed by: STUDENT IN AN ORGANIZED HEALTH CARE EDUCATION/TRAINING PROGRAM

## 2022-04-17 PROCEDURE — 2580000003 HC RX 258: Performed by: STUDENT IN AN ORGANIZED HEALTH CARE EDUCATION/TRAINING PROGRAM

## 2022-04-17 RX ORDER — 0.9 % SODIUM CHLORIDE 0.9 %
1000 INTRAVENOUS SOLUTION INTRAVENOUS ONCE
Status: COMPLETED | OUTPATIENT
Start: 2022-04-17 | End: 2022-04-17

## 2022-04-17 RX ORDER — COLCHICINE 0.6 MG/1
0.6 TABLET ORAL ONCE
Status: COMPLETED | OUTPATIENT
Start: 2022-04-17 | End: 2022-04-17

## 2022-04-17 RX ORDER — LIDOCAINE 4 G/G
1 PATCH TOPICAL DAILY
Status: DISCONTINUED | OUTPATIENT
Start: 2022-04-17 | End: 2022-04-17 | Stop reason: HOSPADM

## 2022-04-17 RX ORDER — COLCHICINE 0.6 MG/1
1.2 TABLET ORAL ONCE
Status: COMPLETED | OUTPATIENT
Start: 2022-04-17 | End: 2022-04-17

## 2022-04-17 RX ORDER — IBUPROFEN 800 MG/1
800 TABLET ORAL 3 TIMES DAILY
Qty: 21 TABLET | Refills: 0 | Status: SHIPPED | OUTPATIENT
Start: 2022-04-17 | End: 2022-04-24

## 2022-04-17 RX ADMIN — SODIUM CHLORIDE 1000 ML: 9 INJECTION, SOLUTION INTRAVENOUS at 15:06

## 2022-04-17 RX ADMIN — COLCHICINE 1.2 MG: 0.6 TABLET, FILM COATED ORAL at 15:04

## 2022-04-17 RX ADMIN — COLCHICINE 0.6 MG: 0.6 TABLET, FILM COATED ORAL at 16:09

## 2022-04-17 ASSESSMENT — ENCOUNTER SYMPTOMS
NAUSEA: 0
SINUS PRESSURE: 0
PHOTOPHOBIA: 0
COUGH: 0
TROUBLE SWALLOWING: 0
SINUS PAIN: 0
SORE THROAT: 0
BACK PAIN: 0
DIARRHEA: 0
ABDOMINAL PAIN: 0
WHEEZING: 0
VOMITING: 0
CONSTIPATION: 0
SHORTNESS OF BREATH: 0

## 2022-04-17 ASSESSMENT — PAIN DESCRIPTION - PAIN TYPE: TYPE: ACUTE PAIN

## 2022-04-17 ASSESSMENT — PAIN DESCRIPTION - ONSET: ONSET: ON-GOING

## 2022-04-17 ASSESSMENT — PAIN DESCRIPTION - PROGRESSION: CLINICAL_PROGRESSION: NOT CHANGED

## 2022-04-17 ASSESSMENT — PAIN DESCRIPTION - FREQUENCY: FREQUENCY: INTERMITTENT

## 2022-04-17 ASSESSMENT — PAIN DESCRIPTION - LOCATION: LOCATION: ANKLE

## 2022-04-17 ASSESSMENT — PAIN DESCRIPTION - DESCRIPTORS: DESCRIPTORS: SHARP

## 2022-04-17 ASSESSMENT — PAIN DESCRIPTION - ORIENTATION: ORIENTATION: RIGHT

## 2022-04-17 ASSESSMENT — PAIN SCALES - GENERAL
PAINLEVEL_OUTOF10: 8

## 2022-04-17 NOTE — ED NOTES
Patient is resting in bed with easy and unlabored respirations. Call light in reach. Side rails up x2. Patient denies further complaints or concerns. Will monitor.         Jacob Simpson RN  04/17/22 [id number]

## 2022-04-17 NOTE — ED TRIAGE NOTES
Pt presents to the ED via triage with c/o ankle pain that leads up to his hip. Pt states he was admitted around 2 weeks ago and told he had gout. Pt states they did blood work and found abnormal values. Pt states he received antibiotics and was discharged but the pain has continued since. Pt states he took ibuprofen yesterday and nothing helps the pain.  RR easy and unlabored

## 2022-04-17 NOTE — ED PROVIDER NOTES
[facility] ENCOUN[facility]          Pt Name: Marco Serrano  MRN: [id number]  [place] 1965  Date of evaluation: 4/17/2022  Treating Resident Physician: Maya Francisco DO  Supervising Physician: Dr. Segun Flanagan       Chief Complaint   Patient presents with    Ankle Pain    Gout     History obtained from chart review. HISTORY OF PRESENT ILLNESS    HPI  Marco Serrano is a 59 y.o. male past medical history of HTN, HLD, gout who presents to the emergency department for evaluation of worsening gout. Patient reports worsening right lower extremity pain since Tuesday. States his gout is acting up again. Initially reported pain and swelling in right first metatarsal.  However now pain is more localized in the posterior calf radiating up towards the lateral right hip. Patient reports he feels like his ankle has \"rolled in\" but denies any falls or trauma. The patient reports he has stopped drinking and diet adherence. He does endorse drinking less fluids and is currently on hydrochlorothiazide. Patient states at home he uses ibuprofen but this has not helped with his pain. He reports he has tried colchicine in the past but but it did not help. He denies any sick contacts. Of note, the patient was recently hospitalized 3/28/2022 for acute gout flare. REVIEW OF SYSTEMS   Review of Systems   Constitutional: Positive for activity change. Negative for appetite change, fatigue and fever. HENT: Negative for congestion, sinus pressure, sinus pain, sore throat, tinnitus and trouble swallowing. Eyes: Negative for photophobia. Respiratory: Negative for cough, shortness of breath and wheezing. Cardiovascular: Negative for chest pain, palpitations and leg swelling. Gastrointestinal: Negative for abdominal pain, constipation, diarrhea, nausea and vomiting. Endocrine: Negative for polyuria.    Genitourinary: Negative for difficulty urinating, dysuria, flank pain and hematuria. Musculoskeletal: Positive for arthralgias, gait problem and joint swelling. Negative for back pain, myalgias, neck pain and neck stiffness. Skin: Negative. Neurological: Negative for dizziness, weakness, numbness and headaches. Psychiatric/Behavioral: Negative for agitation, behavioral problems and confusion. PAST MEDICAL AND SURGICAL HISTORY     Past Medical History:   Diagnosis Date    Arthritis     History of blood transfusion     Hyperlipidemia     Hypertension      Past Surgical History:   Procedure Laterality Date    FACIAL FRACTURE SURGERY  1983         MEDICATIONS     Current Facility-Administered Medications:     colchicine (COLCRYS) tablet 1.2 mg, 1.2 mg, Oral, Once, Daquan Falguni, DO    0.9 % sodium chloride bolus, 1,000 mL, IntraVENous, Once, Leonel Merlin, DO    Current Outpatient Medications:     methylPREDNISolone (MEDROL DOSEPACK) 4 MG tablet, Take by mouth., Disp: 1 kit, Rfl: 0    atorvastatin (LIPITOR) 20 MG tablet, Take 20 mg by mouth daily, Disp: , Rfl:     amLODIPine (NORVASC) 5 MG tablet, Take 5 mg by mouth daily, Disp: , Rfl:     metoprolol (LOPRESSOR) 100 MG tablet, Take 100 mg by mouth daily, Disp: , Rfl:     omeprazole (PRILOSEC) 20 MG delayed release capsule, Take 20 mg by mouth daily, Disp: , Rfl:     lisinopril-hydroCHLOROthiazide (PRINZIDE;ZESTORETIC) 20-25 MG per tablet, Take 1 tablet by mouth daily, Disp: , Rfl:     Multiple Vitamin (MULTI-DAY PO), Take 1 tablet by mouth, Disp: , Rfl:     HYDROcodone-acetaminophen (NORCO) 5-325 MG per tablet, Take 1 tablet by mouth every 6 hours as needed for Pain., Disp: , Rfl:       SOCIAL HISTORY     Social History     Social History Narrative    Not on file     Social History     Tobacco Use    Smoking status: Current Every Day Smoker     Packs/day: 1.00    Smokeless tobacco: Current User   Substance Use Topics    Alcohol use:  Yes     Alcohol/week: 42.0 standard drinks     Types: 42 Cans of beer per week     Comment: 6 beers/night    Drug use: Not on file         ALLERGIES     Allergies   Allergen Reactions    Naproxen Swelling    Sulfa Antibiotics Swelling         FAMILY HISTORY   History reviewed. No pertinent family history. PREVIOUS RECORDS   Previous records reviewed: Prior hospitalization records        PHYSICAL EXAM     ED Triage Vitals [04/17/22 1335]   BP Temp Temp Source Pulse Resp SpO2 Height Weight   108/79 98.8 °F (37.1 °C) Oral 95 18 97 % 5' 8\" (1.727 m) 212 lb (96.2 kg)     Initial vital signs and nursing assessment reviewed and normal. Body mass index is 32.23 kg/m². Pulsoximetry is normal per my interpretation. Additional Vital Signs:  Vitals:    04/17/22 1335   BP: 108/79   Pulse: 95   Resp: 18   Temp: 98.8 °F (37.1 °C)   SpO2: 97%       Physical Exam  Vitals and nursing note reviewed. Constitutional:       Appearance: He is obese. Comments: Appears older than stated age   HENT:      Head: Normocephalic and atraumatic. Right Ear: External ear normal.      Left Ear: External ear normal.      Nose: Nose normal.      Mouth/Throat:      Mouth: Mucous membranes are dry. Pharynx: Oropharynx is clear. No oropharyngeal exudate. Eyes:      Extraocular Movements: Extraocular movements intact. Conjunctiva/sclera: Conjunctivae normal.      Pupils: Pupils are equal, round, and reactive to light. Cardiovascular:      Rate and Rhythm: Normal rate. Pulses: Normal pulses. Heart sounds: Normal heart sounds. No murmur heard. Pulmonary:      Effort: Pulmonary effort is normal.      Breath sounds: No wheezing, rhonchi or rales. Abdominal:      General: Bowel sounds are normal.      Palpations: Abdomen is soft. Tenderness: There is no abdominal tenderness. There is no guarding. Musculoskeletal:         General: Tenderness present. Cervical back: Normal range of motion and neck supple. No tenderness. Right lower leg: No edema. Left lower leg: No edema. Comments: RLE ROM limited secondary to pain. Tenderness with calf squeeze and palpitation of right lateral hip. First right metatarsal swollen. Skin:     General: Skin is warm and dry. Capillary Refill: Capillary refill takes 2 to 3 seconds. Neurological:      General: No focal deficit present. Mental Status: He is alert and oriented to person, place, and time. Cranial Nerves: No cranial nerve deficit. Sensory: No sensory deficit. Motor: No weakness. Psychiatric:         Mood and Affect: Mood normal.         Behavior: Behavior normal.         Thought Content: Thought content normal.             MEDICAL DECISION MAKING   Initial Assessment: 61-year-old male presents for right ankle pain, right hip pain and gout. States his flare started on 4/12/2022 and denies any inciting events. Not alleviated by ibuprofen. Reports alcohol cessation and diet control. Continues to be on thiazide and endorses decreased IV fluid intake due to decreased ambulation. Differential DX includes  1. Acute gouty flare. 2. Cellulitis  3. Trochanter bursitis  4. DVT  5. Muscle strain  6. Arthritis  7. Unlikely IT band syndrome  Plan:    Check CBC and BMP to evaluate electrolytes and hydration status.    Check uric acid level   Check ESR and CRP   Ordered x-ray of right hip   Given colchicine 1.2 mg p.o. x1   Started 1 L NS bolus for fluid resuscitation        ED RESULTS   Laboratory results:  Labs Reviewed   CBC WITH AUTO DIFFERENTIAL    Narrative:     Epic Plan - [id number]   BASIC METABOLIC PANEL W/ REFLEX TO MG FOR LOW K    Narrative:     Epic Plan - [id number]   C-REACTIVE PROTEIN    Narrative:     Epic Plan - [id number]   SEDIMENTATION RATE   URIC ACID       Radiologic studies results:  XR HIP RIGHT (2-3 VIEWS)    (Results Pending)       ED Medications administered this visit:   Medications   colchicine (COLCRYS) tablet 1.2 mg (has no administration in time range)   0.9 % sodium chloride bolus (has no administration in time range)         ED COURSE      In the ED, vitals T-max 98.8, RR 18, HR 95, /79, SPO2 97% RA. Lab significant for Hgb 13.0, HCT 39.3, sodium 134, chloride 95, BUN 19, Cr 1.2 serum osmolality 270.9. CRP 8.56, ESR 81. Uric acid 7.3. Right hip x-ray negative. Given 1 L NS bolus for fluid resuscitation and colchicine 1.2 mg p.o. x1 for loading followed by colchicine 6 mg p.o. x1 an hour later. Crystal Silvana On presentation patient is in acute distress, complaining of pain and right hip and lower leg. States pain radiates up from his ankle. On examination right first metatarsal swollen but not erythematous with minimal tenderness to palpation. Posterior calf tenderness elicited with palpation, radiating up the leg and into the lateral hip. Pinpoint tenderness elicited at right lateral hip at the trochanter. On review interviewing the patient he states he does consume red meat \"when he feels like it\". Additionally reiterate avoidance of alcohol. The patient voiced understanding. Advised patient to take ibuprofen 800 mg p.o. 3 times daily x7. Follow-up with primary for further management and adjustments. Strict return precautions and follow up instructions were discussed with the patient prior to discharge, with which the patient agrees. MEDICATION CHANGES     New Prescriptions    No medications on file         FINAL DISPOSITION     Final diagnoses:   Acute idiopathic gout of multiple sites     Condition: condition: fair  Dispo: Discharge to home      This transcription was electronically signed. Parts of this transcriptions may have been dictated by use of voice recognition software and electronically transcribed, and parts may have been transcribed with the assistance of an ED scribe. The transcription may contain errors not detected in proofreading. Please refer to my supervising physician's documentation if my documentation differs.     Electronically SignedSanto Slider, , 04/17/22, 2:15 PM       Iseral Degree, DO  Resident  04/17/22 [id number]